# Patient Record
Sex: FEMALE | Race: WHITE
[De-identification: names, ages, dates, MRNs, and addresses within clinical notes are randomized per-mention and may not be internally consistent; named-entity substitution may affect disease eponyms.]

---

## 2020-03-06 ENCOUNTER — HOSPITAL ENCOUNTER (INPATIENT)
Dept: HOSPITAL 47 - EC | Age: 80
LOS: 2 days | Discharge: HOSPICE | DRG: 871 | End: 2020-03-08
Attending: HOSPITALIST | Admitting: HOSPITALIST
Payer: MEDICARE

## 2020-03-06 VITALS — BODY MASS INDEX: 27.9 KG/M2

## 2020-03-06 DIAGNOSIS — J96.02: ICD-10-CM

## 2020-03-06 DIAGNOSIS — Z51.5: ICD-10-CM

## 2020-03-06 DIAGNOSIS — N17.0: ICD-10-CM

## 2020-03-06 DIAGNOSIS — R63.0: ICD-10-CM

## 2020-03-06 DIAGNOSIS — Z79.82: ICD-10-CM

## 2020-03-06 DIAGNOSIS — D69.6: ICD-10-CM

## 2020-03-06 DIAGNOSIS — I48.0: ICD-10-CM

## 2020-03-06 DIAGNOSIS — T83.83XA: ICD-10-CM

## 2020-03-06 DIAGNOSIS — J96.01: ICD-10-CM

## 2020-03-06 DIAGNOSIS — F41.9: ICD-10-CM

## 2020-03-06 DIAGNOSIS — A41.9: Primary | ICD-10-CM

## 2020-03-06 DIAGNOSIS — E78.5: ICD-10-CM

## 2020-03-06 DIAGNOSIS — I13.0: ICD-10-CM

## 2020-03-06 DIAGNOSIS — E11.22: ICD-10-CM

## 2020-03-06 DIAGNOSIS — F32.9: ICD-10-CM

## 2020-03-06 DIAGNOSIS — Z79.1: ICD-10-CM

## 2020-03-06 DIAGNOSIS — G93.41: ICD-10-CM

## 2020-03-06 DIAGNOSIS — Z79.4: ICD-10-CM

## 2020-03-06 DIAGNOSIS — Z79.899: ICD-10-CM

## 2020-03-06 DIAGNOSIS — I50.9: ICD-10-CM

## 2020-03-06 DIAGNOSIS — I69.320: ICD-10-CM

## 2020-03-06 DIAGNOSIS — N18.3: ICD-10-CM

## 2020-03-06 DIAGNOSIS — Z74.01: ICD-10-CM

## 2020-03-06 DIAGNOSIS — M19.90: ICD-10-CM

## 2020-03-06 DIAGNOSIS — Z79.01: ICD-10-CM

## 2020-03-06 DIAGNOSIS — N13.6: ICD-10-CM

## 2020-03-06 DIAGNOSIS — Z66: ICD-10-CM

## 2020-03-06 DIAGNOSIS — J44.0: ICD-10-CM

## 2020-03-06 DIAGNOSIS — E87.4: ICD-10-CM

## 2020-03-06 DIAGNOSIS — J44.1: ICD-10-CM

## 2020-03-06 DIAGNOSIS — D63.1: ICD-10-CM

## 2020-03-06 DIAGNOSIS — R31.9: ICD-10-CM

## 2020-03-06 DIAGNOSIS — R04.0: ICD-10-CM

## 2020-03-06 DIAGNOSIS — E87.5: ICD-10-CM

## 2020-03-06 DIAGNOSIS — I69.351: ICD-10-CM

## 2020-03-06 DIAGNOSIS — J10.00: ICD-10-CM

## 2020-03-06 LAB
ALBUMIN SERPL-MCNC: 2.9 G/DL (ref 3.5–5)
ALP SERPL-CCNC: 160 U/L (ref 38–126)
ALT SERPL-CCNC: 14 U/L (ref 4–34)
ANION GAP SERPL CALC-SCNC: 14 MMOL/L
APTT BLD: 35 SEC (ref 22–30)
AST SERPL-CCNC: 34 U/L (ref 14–36)
BASOPHILS # BLD AUTO: 0 K/UL (ref 0–0.2)
BASOPHILS NFR BLD AUTO: 0 %
BUN SERPL-SCNC: 69 MG/DL (ref 7–17)
CALCIUM SPEC-MCNC: 7.6 MG/DL (ref 8.4–10.2)
CHLORIDE SERPL-SCNC: 108 MMOL/L (ref 98–107)
CO2 SERPL-SCNC: 19 MMOL/L (ref 22–30)
EOSINOPHIL # BLD AUTO: 0 K/UL (ref 0–0.7)
EOSINOPHIL NFR BLD AUTO: 0 %
ERYTHROCYTE [DISTWIDTH] IN BLOOD BY AUTOMATED COUNT: 2.84 M/UL (ref 3.8–5.4)
ERYTHROCYTE [DISTWIDTH] IN BLOOD: 14.8 % (ref 11.5–15.5)
GLUCOSE BLD-MCNC: 284 MG/DL (ref 75–99)
GLUCOSE SERPL-MCNC: 264 MG/DL (ref 74–99)
HCT VFR BLD AUTO: 26.4 % (ref 34–46)
HGB BLD-MCNC: 8.2 GM/DL (ref 11.4–16)
INR PPP: 1.2 (ref ?–1.2)
LYMPHOCYTES # SPEC AUTO: 0.6 K/UL (ref 1–4.8)
LYMPHOCYTES NFR SPEC AUTO: 11 %
MCH RBC QN AUTO: 28.9 PG (ref 25–35)
MCHC RBC AUTO-ENTMCNC: 31 G/DL (ref 31–37)
MCV RBC AUTO: 93.1 FL (ref 80–100)
MONOCYTES # BLD AUTO: 0.2 K/UL (ref 0–1)
MONOCYTES NFR BLD AUTO: 3 %
NEUTROPHILS # BLD AUTO: 4.3 K/UL (ref 1.3–7.7)
NEUTROPHILS NFR BLD AUTO: 84 %
PLATELET # BLD AUTO: 102 K/UL (ref 150–450)
POTASSIUM SERPL-SCNC: 5 MMOL/L (ref 3.5–5.1)
PROT SERPL-MCNC: 6.9 G/DL (ref 6.3–8.2)
PT BLD: 12.1 SEC (ref 9–12)
SODIUM SERPL-SCNC: 141 MMOL/L (ref 137–145)
WBC # BLD AUTO: 5.1 K/UL (ref 3.8–10.6)

## 2020-03-06 PROCEDURE — 80048 BASIC METABOLIC PNL TOTAL CA: CPT

## 2020-03-06 PROCEDURE — 86920 COMPATIBILITY TEST SPIN: CPT

## 2020-03-06 PROCEDURE — 83605 ASSAY OF LACTIC ACID: CPT

## 2020-03-06 PROCEDURE — 86901 BLOOD TYPING SEROLOGIC RH(D): CPT

## 2020-03-06 PROCEDURE — 82272 OCCULT BLD FECES 1-3 TESTS: CPT

## 2020-03-06 PROCEDURE — 93005 ELECTROCARDIOGRAM TRACING: CPT

## 2020-03-06 PROCEDURE — 36415 COLL VENOUS BLD VENIPUNCTURE: CPT

## 2020-03-06 PROCEDURE — 76770 US EXAM ABDO BACK WALL COMP: CPT

## 2020-03-06 PROCEDURE — 84484 ASSAY OF TROPONIN QUANT: CPT

## 2020-03-06 PROCEDURE — 87502 INFLUENZA DNA AMP PROBE: CPT

## 2020-03-06 PROCEDURE — 94640 AIRWAY INHALATION TREATMENT: CPT

## 2020-03-06 PROCEDURE — 86900 BLOOD TYPING SEROLOGIC ABO: CPT

## 2020-03-06 PROCEDURE — 71046 X-RAY EXAM CHEST 2 VIEWS: CPT

## 2020-03-06 PROCEDURE — 94660 CPAP INITIATION&MGMT: CPT

## 2020-03-06 PROCEDURE — 36600 WITHDRAWAL OF ARTERIAL BLOOD: CPT

## 2020-03-06 PROCEDURE — 86850 RBC ANTIBODY SCREEN: CPT

## 2020-03-06 PROCEDURE — 85610 PROTHROMBIN TIME: CPT

## 2020-03-06 PROCEDURE — 87186 SC STD MICRODIL/AGAR DIL: CPT

## 2020-03-06 PROCEDURE — 87077 CULTURE AEROBIC IDENTIFY: CPT

## 2020-03-06 PROCEDURE — 93306 TTE W/DOPPLER COMPLETE: CPT

## 2020-03-06 PROCEDURE — 96374 THER/PROPH/DIAG INJ IV PUSH: CPT

## 2020-03-06 PROCEDURE — 80053 COMPREHEN METABOLIC PANEL: CPT

## 2020-03-06 PROCEDURE — 71045 X-RAY EXAM CHEST 1 VIEW: CPT

## 2020-03-06 PROCEDURE — 83880 ASSAY OF NATRIURETIC PEPTIDE: CPT

## 2020-03-06 PROCEDURE — 85025 COMPLETE CBC W/AUTO DIFF WBC: CPT

## 2020-03-06 PROCEDURE — 87040 BLOOD CULTURE FOR BACTERIA: CPT

## 2020-03-06 PROCEDURE — 81001 URINALYSIS AUTO W/SCOPE: CPT

## 2020-03-06 PROCEDURE — 85730 THROMBOPLASTIN TIME PARTIAL: CPT

## 2020-03-06 PROCEDURE — 82040 ASSAY OF SERUM ALBUMIN: CPT

## 2020-03-06 PROCEDURE — 96375 TX/PRO/DX INJ NEW DRUG ADDON: CPT

## 2020-03-06 PROCEDURE — 99285 EMERGENCY DEPT VISIT HI MDM: CPT

## 2020-03-06 PROCEDURE — 87086 URINE CULTURE/COLONY COUNT: CPT

## 2020-03-06 PROCEDURE — 82805 BLOOD GASES W/O2 SATURATION: CPT

## 2020-03-06 RX ADMIN — CEFAZOLIN SCH MLS/HR: 330 INJECTION, POWDER, FOR SOLUTION INTRAMUSCULAR; INTRAVENOUS at 17:16

## 2020-03-06 RX ADMIN — METHYLPREDNISOLONE SODIUM SUCCINATE SCH MG: 125 INJECTION, POWDER, FOR SOLUTION INTRAMUSCULAR; INTRAVENOUS at 23:41

## 2020-03-06 RX ADMIN — APIXABAN SCH MG: 2.5 TABLET, FILM COATED ORAL at 22:09

## 2020-03-06 RX ADMIN — METHYLPREDNISOLONE SODIUM SUCCINATE SCH: 125 INJECTION, POWDER, FOR SOLUTION INTRAMUSCULAR; INTRAVENOUS at 20:24

## 2020-03-06 RX ADMIN — PIPERACILLIN AND TAZOBACTAM SCH MLS/HR: 3; .375 INJECTION, POWDER, FOR SOLUTION INTRAVENOUS at 20:36

## 2020-03-06 RX ADMIN — NICARDIPINE HYDROCHLORIDE SCH MLS/HR: 2.5 INJECTION INTRAVENOUS at 13:33

## 2020-03-06 RX ADMIN — GABAPENTIN SCH MG: 100 CAPSULE ORAL at 22:09

## 2020-03-06 RX ADMIN — Medication SCH: at 22:09

## 2020-03-06 RX ADMIN — IPRATROPIUM BROMIDE AND ALBUTEROL SULFATE SCH ML: .5; 3 SOLUTION RESPIRATORY (INHALATION) at 19:08

## 2020-03-06 RX ADMIN — NICARDIPINE HYDROCHLORIDE SCH MLS/HR: 2.5 INJECTION INTRAVENOUS at 13:32

## 2020-03-06 RX ADMIN — BETHANECHOL CHLORIDE SCH MG: 25 TABLET ORAL at 22:09

## 2020-03-06 RX ADMIN — GUAIFENESIN AND DEXTROMETHORPHAN SCH: 100; 10 SYRUP ORAL at 22:09

## 2020-03-06 RX ADMIN — ATORVASTATIN CALCIUM SCH MG: 40 TABLET, FILM COATED ORAL at 22:09

## 2020-03-06 RX ADMIN — CEFAZOLIN SCH: 330 INJECTION, POWDER, FOR SOLUTION INTRAMUSCULAR; INTRAVENOUS at 15:11

## 2020-03-06 RX ADMIN — METOPROLOL TARTRATE SCH MG: 25 TABLET, FILM COATED ORAL at 22:09

## 2020-03-06 RX ADMIN — INSULIN ASPART SCH UNIT: 100 INJECTION, SOLUTION INTRAVENOUS; SUBCUTANEOUS at 22:10

## 2020-03-06 NOTE — HP
HISTORY AND PHYSICAL



DATE OF SERVICE:

03/06/2020



CHIEF COMPLAINT:

Shortness of breath.



HISTORY OF PRESENT ILLNESS:

This 79-year-old woman with a past medical history of multiple medical problems,

previous stroke with right-sided hemiplegia, history of diabetes mellitus, type 2,

history of hyperlipidemia being followed by Dr. Benton in [facility] in the

outpatient setting, apparently not feeling well over the past 1 week.  The patient had

increasing cough and shortness of breath.  The patient came to [facility] and

was admitted for further evaluation and treatment.  The creatinine was found to be 4.1,

indicating acute renal failure.  Lactic acid was also elevated. Chest x-ray showed

possible pneumonia.  Patient admitted for further evaluation and treatment.  Stool OB

was negative.  Patient also there is a suspicion of gastrointestinal blood loss.

Hemoglobin is 8.2. Stool OB was positive apparently in the nursing home but currently

it is negative. Currently, the patient is slightly confused but able to give a sketchy

history at this time.  There is no history of any rigors, chills at this time.



PAST MEDICAL HISTORY:

History of stroke, history of diabetes mellitus, type 2.



MEDICATIONS:

1. Guaifenesin 10 mL mg p.o. q.i.d.

2. Senna 8.6 p.o. daily.

3. Tylenol 650 q.4 p.r.n.

4. Milk of Magnesia.

5. DuoNeb q.i.d. and p.r.n.

6. Glucose application.

7. Motrin 200 mg q.i.d. p.r.n.

8. Neurontin 100 mg p.o. t.i.d.

9. Urecholine 25 mg p.o. t.i.d.

10.NovoLog 4 units subcu b.i.d.

11.Lantus 22 units subcu daily.

12.Flomax 0.4 daily.

13.Lopressor 12.5 mg b.i.d.

14.Eliquis 2.5 mg b.i.d.

15.Protonix 40 mg.

16.Melatonin 1 mg p.o. q.h.s.

17.Celexa 20 mg p.o. daily.

18.Lipitor 40 mg at bedtime.

19.Aspirin 81 mg p.o. daily.



ALLERGIES:

None.



Family history, social history and review of systems could not be taken because of the

patient's change in mental status.  No history of smoking per chart.



PHYSICAL EXAM:

Patient is conscious but slightly confused.  Short of breath.  Pulse is 90. Blood

pressure 99/60, respirations 16, temp 97.4, pulse ox 99% on 2 L.

HEENT conjunctivae normal.  Oral mucosa moist.

NECK is no jugular venous distention.  No carotid bruit. No lymph node enlargement.

Jugular venous distention at the root of the neck.

CARDIOVASCULAR system:  S1, S2 muffled.  No S3, no S4.

RESPIRATIONS: Breath sounds diminished in the bases, bilateral scattered rhonchi and

crackles.  Expiratory wheezing also present.

ABDOMEN:  Soft, nontender.  No mass palpable.

LEGS:  No edema, no swelling.

NERVOUS SYSTEM: Higher functions as mentioned earlier. Moves all 4 limbs.  Mild diffuse

weakness.

LYMPHATICS: No lymph nodes palpable in the neck, axillae or groin.

SKIN no ulcers, no rash and no bleeding.

JOINTS:  No active deforming arthropathy.



LABS:

WBC 5.2, hemoglobin is 8.2, platelets 102, INR is 1.2.  Sodium 141, potassium 5,

creatinine 4.10, glucose 264.  Plasma lactic acid 2.1, calcium is 7.6, albumin is 2.9.

Stool OB is negative.



ASSESSMENT:

1. Possible chronic obstructive pulmonary disease acute exacerbation with bibasilar

    pneumonia, possibly healthcare associated pneumonia with possible sepsis present on

    admission.

2. Change in mental status, metabolic encephalopathy, acute on chronic.

3. Chronic obstructive pulmonary disease.

4. Anemia, normocytic of undetermined etiology.

5. Thrombocytopenia.

6. Acute renal failure possible acute tubular necrosis, prerenal renal failure.

7. Diabetes mellitus type 2.

8. Elevated plasma lactic acid secondary to sepsis.

9. Hypoalbuminemia with no evidence of malnutrition.

10.Hyperlipidemia.

11.Diabetes mellitus type 2.

12.Rule out congestive heart failure.

13.FULL CODE.



RECOMMENDATIONS AND DISCUSSION:

This 79-year-old woman who presented with multiple complex medical issues, we will

monitor the patient closely, continue the current medications. Continue symptomatic

treatment. We will optimize bronchodilator treatment, empiric antibiotics.  I would

recommend Zosyn at this time.  Otherwise, cautious IV fluids.  Nephrology consultation

and cardiology are also consulted for possible congestive heart failure.  We will also

obtain a 2D echo with Doppler.





Otherwise, NT proBNP is 5788.  Troponins are negative.  I would also recommend an EKG.

Prognosis guarded because of multiple complex medical issues.  Further recommendations

to follow.  The patient is currently FULL CODE.  A copy of this dictation is being

forwarded to Dr. Benton who is the primary physician.





MMEVERL / IJN: [id number] / Job#: [id number]

## 2020-03-06 NOTE — XR
EXAMINATION TYPE: XR chest 2V

 

DATE OF EXAM: 3/6/2020

 

COMPARISON: NONE

 

HISTORY: Fever

 

TECHNIQUE:  Frontal and lateral views of the chest are obtained.

 

FINDINGS:  There is no focal air space opacity, pleural effusion, or pneumothorax seen.  The cardiac 
silhouette size is mildly enlarged.  There is diffuse osseous demineralization. Mid thoracic compress
ion deformities age-indeterminate given no priors for comparison. Correlate with point tenderness.

 

IMPRESSION:  Enlarged cardiomediastinal silhouette. No acute pulmonary pathology. Age-indeterminate m
id thoracic compression deformity. Correlate with point tenderness.

## 2020-03-06 NOTE — ED
General Adult HPI





- General


Chief complaint: Recheck/Abnormal Lab/Rx


Stated complaint: CHRISS


Time Seen by Provider: 03/06/20 11:58


Source: patient, EMS, RN notes reviewed, old records reviewed


Mode of arrival: EMS


Limitations: no limitations





- History of Present Illness


Initial comments: 





Patient is an 79-year-old female who presents emergency department today with 

difficulty breathing.  She was transferred from  [facility].  Patient states

that she has been having a cough, worsening difficulty breathing for the past 

few days.  She also had a positive occult stool.  Patient was found have a low 

oxygen saturation today in the 70s.  It had improved after Patient was placed on

2 L of oxygen.  Patient does not typically wear oxygen.





- Related Data


                                    Allergies











Allergy/AdvReac Type Severity Reaction Status Date / Time


 


No Known Allergies Allergy   Verified 03/06/20 11:30














Review of Systems


ROS Statement: 


Those systems with pertinent positive or pertinent negative responses have been 

documented in the HPI.





ROS Other: All systems not noted in ROS Statement are negative.





Past Medical History


Additional Past Medical History / Comment(s): pt poor historian


History of Any Multi-Drug Resistant Organisms: None Reported


Additional Past Surgical History / Comment(s): ptpoor historian


Past Psychological History: No Psychological Hx Reported


Smoking Status: Never smoker


Past Alcohol Use History: None Reported


Past Drug Use History: None Reported





General Exam





- General Exam Comments


Initial Comments: 





79-year-old female.  Patient is clammy. 


Limitations: no limitations


General appearance: alert


Head exam: Present: atraumatic, normocephalic, normal inspection


Eye exam: Present: normal appearance, PERRL, EOMI.  Absent: scleral icterus, 

conjunctival injection, periorbital swelling


ENT exam: Present: normal exam, mucous membranes moist


Neck exam: Present: normal inspection.  Absent: tenderness, meningismus, 

lymphadenopathy


Respiratory exam: Present: other (Crackles bilaterally.).  Absent: respiratory 

distress, wheezes, rales, rhonchi, stridor


Cardiovascular Exam: Present: regular rate, normal rhythm, normal heart sounds. 

Absent: systolic murmur, diastolic murmur, rubs, gallop, clicks


GI/Abdominal exam: Present: soft, normal bowel sounds, other (diarrhea, reddish 

stool).  Absent: distended, tenderness, guarding, rebound, rigid


Extremities exam: Present: normal inspection, full ROM, normal capillary refill.

 Absent: tenderness, pedal edema, joint swelling, calf tenderness


Back exam: Present: normal inspection


Neurological exam: Present: alert, oriented X3, CN II-XII intact


Psychiatric exam: Present: normal affect, normal mood





Course


                                   Vital Signs











  03/06/20 03/06/20 03/06/20





  11:25 13:40 13:51


 


Temperature 97.4 F L  


 


Pulse Rate 88 86 88


 


Respiratory 18  





Rate   


 


Blood Pressure 101/46  


 


O2 Sat by Pulse 100  





Oximetry   














EKG Findings





- EKG Comments:


EKG Findings:: EKG performed at 1223 shows normal sinus rhythm.  Prolonged QT.  

Ventricular rate of 84 beats were minute period.  It was 138 ms.  QS duration 72

ms.  QT QTc is 422/4 and 98 ms.





Medical Decision Making





- Medical Decision Making





Patient is an 79-year-old female who presents emergency department today for 

difficulty in breathing, and concern for occult positive stool.  Patient has had

history of stroke in the past causing residual right leg pain that is chronic.  

Patient's had multiple episodes of diarrhea in emergency department.  Her stool 

occult was negative.  She's having orange reddish stool.  Patient has crackles 

on bilateral lungs was hypoxic without oxygen earlier today.  On emergency 

department she was given breathing treatment.  Chest x-ray shows evidence of 

cardiomegaly with no pleural effusion.  She does have an elevated BNP of 6000.  

She was found to have acute on chronic renal failure.  Patient's son is unable 

to state what her previous lab values were.  Patient was started on 1 g of 

Rocephin as well for preoperative likely UTI due to the persistent diarrhea.  

Unable to obtain a straight cath due to the amount of stool. Patient was found 

to have a low hemoglobin of 8.2.  She started on protonix. 





Patient will be admitted at this time for hypoxia, CHF, renal failure, and 

concern for GI bleed with low hemoglobin however her occult was negative.





- Lab Data


Result diagrams: 


                                 03/06/20 12:58





                                 03/06/20 12:58


                                   Lab Results











  03/06/20 03/06/20 03/06/20 Range/Units





  12:51 12:58 12:58 


 


WBC   5.1   (3.8-10.6)  k/uL


 


RBC   2.84 L   (3.80-5.40)  m/uL


 


Hgb   8.2 L   (11.4-16.0)  gm/dL


 


Hct   26.4 L   (34.0-46.0)  %


 


MCV   93.1   (80.0-100.0)  fL


 


MCH   28.9   (25.0-35.0)  pg


 


MCHC   31.0   (31.0-37.0)  g/dL


 


RDW   14.8   (11.5-15.5)  %


 


Plt Count   102 L   (150-450)  k/uL


 


Neutrophils %   84   %


 


Lymphocytes %   11   %


 


Monocytes %   3   %


 


Eosinophils %   0   %


 


Basophils %   0   %


 


Neutrophils #   4.3   (1.3-7.7)  k/uL


 


Lymphocytes #   0.6 L   (1.0-4.8)  k/uL


 


Monocytes #   0.2   (0-1.0)  k/uL


 


Eosinophils #   0.0   (0-0.7)  k/uL


 


Basophils #   0.0   (0-0.2)  k/uL


 


Hypochromasia   Marked   


 


PT    12.1 H  (9.0-12.0)  sec


 


INR    1.2 H  (<1.2)  


 


APTT    35.0 H  (22.0-30.0)  sec


 


Sodium     (137-145)  mmol/L


 


Potassium     (3.5-5.1)  mmol/L


 


Chloride     ()  mmol/L


 


Carbon Dioxide     (22-30)  mmol/L


 


Anion Gap     mmol/L


 


BUN     (7-17)  mg/dL


 


Creatinine     (0.52-1.04)  mg/dL


 


Est GFR (CKD-EPI)AfAm     (>60 ml/min/1.73 sqM)  


 


Est GFR (CKD-EPI)NonAf     (>60 ml/min/1.73 sqM)  


 


Glucose     (74-99)  mg/dL


 


Plasma Lactic Acid Nii  2.1 H*    (0.7-2.0)  mmol/L


 


Calcium     (8.4-10.2)  mg/dL


 


Total Bilirubin     (0.2-1.3)  mg/dL


 


AST     (14-36)  U/L


 


ALT     (4-34)  U/L


 


Alkaline Phosphatase     ()  U/L


 


Troponin I     (0.000-0.034)  ng/mL


 


NT-Pro-B Natriuret Pep     pg/mL


 


Total Protein     (6.3-8.2)  g/dL


 


Albumin     (3.5-5.0)  g/dL


 


Stool Occult Blood     (Negative)  


 


Blood Type     


 


Blood Type Confirm     


 


Blood Type Recheck     


 


Bld Type Recheck Status     


 


Antibody Screen     


 


Spec Expiration Date     














  03/06/20 03/06/20 03/06/20 Range/Units





  12:58 12:58 12:58 


 


WBC     (3.8-10.6)  k/uL


 


RBC     (3.80-5.40)  m/uL


 


Hgb     (11.4-16.0)  gm/dL


 


Hct     (34.0-46.0)  %


 


MCV     (80.0-100.0)  fL


 


MCH     (25.0-35.0)  pg


 


MCHC     (31.0-37.0)  g/dL


 


RDW     (11.5-15.5)  %


 


Plt Count     (150-450)  k/uL


 


Neutrophils %     %


 


Lymphocytes %     %


 


Monocytes %     %


 


Eosinophils %     %


 


Basophils %     %


 


Neutrophils #     (1.3-7.7)  k/uL


 


Lymphocytes #     (1.0-4.8)  k/uL


 


Monocytes #     (0-1.0)  k/uL


 


Eosinophils #     (0-0.7)  k/uL


 


Basophils #     (0-0.2)  k/uL


 


Hypochromasia     


 


PT     (9.0-12.0)  sec


 


INR     (<1.2)  


 


APTT     (22.0-30.0)  sec


 


Sodium  141    (137-145)  mmol/L


 


Potassium  5.0    (3.5-5.1)  mmol/L


 


Chloride  108 H    ()  mmol/L


 


Carbon Dioxide  19 L    (22-30)  mmol/L


 


Anion Gap  14    mmol/L


 


BUN  69 H    (7-17)  mg/dL


 


Creatinine  4.10 H    (0.52-1.04)  mg/dL


 


Est GFR (CKD-EPI)AfAm  11    (>60 ml/min/1.73 sqM)  


 


Est GFR (CKD-EPI)NonAf  10    (>60 ml/min/1.73 sqM)  


 


Glucose  264 H    (74-99)  mg/dL


 


Plasma Lactic Acid Nii     (0.7-2.0)  mmol/L


 


Calcium  7.6 L    (8.4-10.2)  mg/dL


 


Total Bilirubin  0.6    (0.2-1.3)  mg/dL


 


AST  34    (14-36)  U/L


 


ALT  14    (4-34)  U/L


 


Alkaline Phosphatase  160 H    ()  U/L


 


Troponin I   <0.012   (0.000-0.034)  ng/mL


 


NT-Pro-B Natriuret Pep    5780  pg/mL


 


Total Protein  6.9    (6.3-8.2)  g/dL


 


Albumin  2.9 L    (3.5-5.0)  g/dL


 


Stool Occult Blood     (Negative)  


 


Blood Type     


 


Blood Type Confirm     


 


Blood Type Recheck     


 


Bld Type Recheck Status     


 


Antibody Screen     


 


Spec Expiration Date     














  03/06/20 03/06/20 03/06/20 Range/Units





  13:00 13:30 13:32 


 


WBC     (3.8-10.6)  k/uL


 


RBC     (3.80-5.40)  m/uL


 


Hgb     (11.4-16.0)  gm/dL


 


Hct     (34.0-46.0)  %


 


MCV     (80.0-100.0)  fL


 


MCH     (25.0-35.0)  pg


 


MCHC     (31.0-37.0)  g/dL


 


RDW     (11.5-15.5)  %


 


Plt Count     (150-450)  k/uL


 


Neutrophils %     %


 


Lymphocytes %     %


 


Monocytes %     %


 


Eosinophils %     %


 


Basophils %     %


 


Neutrophils #     (1.3-7.7)  k/uL


 


Lymphocytes #     (1.0-4.8)  k/uL


 


Monocytes #     (0-1.0)  k/uL


 


Eosinophils #     (0-0.7)  k/uL


 


Basophils #     (0-0.2)  k/uL


 


Hypochromasia     


 


PT     (9.0-12.0)  sec


 


INR     (<1.2)  


 


APTT     (22.0-30.0)  sec


 


Sodium     (137-145)  mmol/L


 


Potassium     (3.5-5.1)  mmol/L


 


Chloride     ()  mmol/L


 


Carbon Dioxide     (22-30)  mmol/L


 


Anion Gap     mmol/L


 


BUN     (7-17)  mg/dL


 


Creatinine     (0.52-1.04)  mg/dL


 


Est GFR (CKD-EPI)AfAm     (>60 ml/min/1.73 sqM)  


 


Est GFR (CKD-EPI)NonAf     (>60 ml/min/1.73 sqM)  


 


Glucose     (74-99)  mg/dL


 


Plasma Lactic Acid Nii     (0.7-2.0)  mmol/L


 


Calcium     (8.4-10.2)  mg/dL


 


Total Bilirubin     (0.2-1.3)  mg/dL


 


AST     (14-36)  U/L


 


ALT     (4-34)  U/L


 


Alkaline Phosphatase     ()  U/L


 


Troponin I     (0.000-0.034)  ng/mL


 


NT-Pro-B Natriuret Pep     pg/mL


 


Total Protein     (6.3-8.2)  g/dL


 


Albumin     (3.5-5.0)  g/dL


 


Stool Occult Blood  Negative    (Negative)  


 


Blood Type   O Positive   


 


Blood Type Confirm    O Positive  


 


Blood Type Recheck   No Previous Record   


 


Bld Type Recheck Status   CABO Indicated   


 


Antibody Screen   NEGATIVE   


 


Spec Expiration Date   03/09/2020 - 2330   














- Radiology Data


Radiology results: report reviewed


Enlarged cardiomediastinal silhouette.  No acute pulmonary pathology.  Age-inde

terminate midthoracic compression deformity.





Disposition


Clinical Impression: 


 CHF (congestive heart failure), Renal failure, Diarrhea, Anemia





Disposition: ADMITTED AS IP TO THIS [facility]


Condition: Stable


Is patient prescribed a controlled substance at d/c from ED?: No


Referrals: 


Hong Benton MD [Primary Care Provider] - 1-2 days


Time of Disposition: 16:03

## 2020-03-07 LAB
ANION GAP SERPL CALC-SCNC: 16 MMOL/L
BASOPHILS # BLD AUTO: 0 K/UL (ref 0–0.2)
BASOPHILS NFR BLD AUTO: 0 %
BUN SERPL-SCNC: 71 MG/DL (ref 7–17)
CALCIUM SPEC-MCNC: 7.5 MG/DL (ref 8.4–10.2)
CHLORIDE SERPL-SCNC: 111 MMOL/L (ref 98–107)
CO2 BLDA-SCNC: 17 MMOL/L (ref 19–24)
CO2 BLDA-SCNC: 19 MMOL/L (ref 19–24)
CO2 BLDA-SCNC: 20 MMOL/L (ref 19–24)
CO2 SERPL-SCNC: 16 MMOL/L (ref 22–30)
EOSINOPHIL # BLD AUTO: 0 K/UL (ref 0–0.7)
EOSINOPHIL NFR BLD AUTO: 0 %
ERYTHROCYTE [DISTWIDTH] IN BLOOD BY AUTOMATED COUNT: 2.81 M/UL (ref 3.8–5.4)
ERYTHROCYTE [DISTWIDTH] IN BLOOD: 14.5 % (ref 11.5–15.5)
GLUCOSE BLD-MCNC: 127 MG/DL (ref 75–99)
GLUCOSE BLD-MCNC: 230 MG/DL (ref 75–99)
GLUCOSE BLD-MCNC: 326 MG/DL (ref 75–99)
GLUCOSE BLD-MCNC: 377 MG/DL (ref 75–99)
GLUCOSE BLD-MCNC: 92 MG/DL (ref 75–99)
GLUCOSE BLD-MCNC: 94 MG/DL (ref 75–99)
GLUCOSE SERPL-MCNC: 82 MG/DL (ref 74–99)
HCO3 BLDA-SCNC: 16 MMOL/L (ref 21–25)
HCO3 BLDA-SCNC: 17 MMOL/L (ref 21–25)
HCO3 BLDA-SCNC: 19 MMOL/L (ref 21–25)
HCT VFR BLD AUTO: 26.6 % (ref 34–46)
HGB BLD-MCNC: 7.9 GM/DL (ref 11.4–16)
LYMPHOCYTES # SPEC AUTO: 0.7 K/UL (ref 1–4.8)
LYMPHOCYTES NFR SPEC AUTO: 11 %
MCH RBC QN AUTO: 28 PG (ref 25–35)
MCHC RBC AUTO-ENTMCNC: 29.5 G/DL (ref 31–37)
MCV RBC AUTO: 94.7 FL (ref 80–100)
MONOCYTES # BLD AUTO: 0.1 K/UL (ref 0–1)
MONOCYTES NFR BLD AUTO: 2 %
NEUTROPHILS # BLD AUTO: 5.3 K/UL (ref 1.3–7.7)
NEUTROPHILS NFR BLD AUTO: 85 %
PCO2 BLDA: 42 MMHG (ref 35–45)
PCO2 BLDA: 47 MMHG (ref 35–45)
PCO2 BLDA: 52 MMHG (ref 35–45)
PH BLDA: 7.16 [PH] (ref 7.35–7.45)
PH BLDA: 7.17 [PH] (ref 7.35–7.45)
PH BLDA: 7.18 [PH] (ref 7.35–7.45)
PH UR: 6.5 [PH] (ref 5–8)
PLATELET # BLD AUTO: 126 K/UL (ref 150–450)
PO2 BLDA: 172 MMHG (ref 83–108)
PO2 BLDA: 331 MMHG (ref 83–108)
PO2 BLDA: >400 MMHG (ref 83–108)
POTASSIUM SERPL-SCNC: 5.6 MMOL/L (ref 3.5–5.1)
PROT UR QL: (no result)
RBC UR QL: >182 /HPF (ref 0–5)
SODIUM SERPL-SCNC: 143 MMOL/L (ref 137–145)
SP GR UR: 1.01 (ref 1–1.03)
UROBILINOGEN UR QL STRIP: <2 MG/DL (ref ?–2)
WBC # BLD AUTO: 6.2 K/UL (ref 3.8–10.6)
WBC # UR AUTO: >182 /HPF (ref 0–5)

## 2020-03-07 RX ADMIN — INSULIN ASPART SCH: 100 INJECTION, SOLUTION INTRAVENOUS; SUBCUTANEOUS at 12:37

## 2020-03-07 RX ADMIN — OSELTAMIVIR PHOSPHATE SCH: 6 POWDER, FOR SUSPENSION ORAL at 10:31

## 2020-03-07 RX ADMIN — ASPIRIN 325 MG ORAL TABLET SCH: 325 PILL ORAL at 16:04

## 2020-03-07 RX ADMIN — IPRATROPIUM BROMIDE AND ALBUTEROL SULFATE SCH ML: .5; 3 SOLUTION RESPIRATORY (INHALATION) at 18:55

## 2020-03-07 RX ADMIN — METOPROLOL TARTRATE SCH MG: 1 INJECTION, SOLUTION INTRAVENOUS at 23:05

## 2020-03-07 RX ADMIN — BETHANECHOL CHLORIDE SCH: 25 TABLET ORAL at 10:30

## 2020-03-07 RX ADMIN — PIPERACILLIN AND TAZOBACTAM SCH MLS/HR: 3; .375 INJECTION, POWDER, FOR SOLUTION INTRAVENOUS at 03:16

## 2020-03-07 RX ADMIN — ATORVASTATIN CALCIUM SCH: 40 TABLET, FILM COATED ORAL at 20:52

## 2020-03-07 RX ADMIN — GABAPENTIN SCH: 100 CAPSULE ORAL at 17:12

## 2020-03-07 RX ADMIN — GUAIFENESIN AND DEXTROMETHORPHAN SCH: 100; 10 SYRUP ORAL at 06:50

## 2020-03-07 RX ADMIN — PIPERACILLIN AND TAZOBACTAM SCH MLS/HR: 3; .375 INJECTION, POWDER, FOR SOLUTION INTRAVENOUS at 23:05

## 2020-03-07 RX ADMIN — INSULIN ASPART SCH: 100 INJECTION, SOLUTION INTRAVENOUS; SUBCUTANEOUS at 16:04

## 2020-03-07 RX ADMIN — IPRATROPIUM BROMIDE AND ALBUTEROL SULFATE PRN ML: .5; 3 SOLUTION RESPIRATORY (INHALATION) at 23:05

## 2020-03-07 RX ADMIN — METHYLPREDNISOLONE SODIUM SUCCINATE SCH MG: 125 INJECTION, POWDER, FOR SOLUTION INTRAMUSCULAR; INTRAVENOUS at 06:59

## 2020-03-07 RX ADMIN — IPRATROPIUM BROMIDE AND ALBUTEROL SULFATE SCH ML: .5; 3 SOLUTION RESPIRATORY (INHALATION) at 16:01

## 2020-03-07 RX ADMIN — IPRATROPIUM BROMIDE AND ALBUTEROL SULFATE SCH ML: .5; 3 SOLUTION RESPIRATORY (INHALATION) at 07:49

## 2020-03-07 RX ADMIN — BETHANECHOL CHLORIDE SCH: 25 TABLET ORAL at 12:37

## 2020-03-07 RX ADMIN — Medication SCH: at 20:54

## 2020-03-07 RX ADMIN — METHYLPREDNISOLONE SODIUM SUCCINATE SCH MG: 125 INJECTION, POWDER, FOR SOLUTION INTRAMUSCULAR; INTRAVENOUS at 12:36

## 2020-03-07 RX ADMIN — METOPROLOL TARTRATE SCH MG: 1 INJECTION, SOLUTION INTRAVENOUS at 17:11

## 2020-03-07 RX ADMIN — METHYLPREDNISOLONE SODIUM SUCCINATE SCH MG: 125 INJECTION, POWDER, FOR SOLUTION INTRAMUSCULAR; INTRAVENOUS at 17:11

## 2020-03-07 RX ADMIN — TAMSULOSIN HYDROCHLORIDE SCH: 0.4 CAPSULE ORAL at 07:59

## 2020-03-07 RX ADMIN — GUAIFENESIN AND DEXTROMETHORPHAN SCH: 100; 10 SYRUP ORAL at 16:05

## 2020-03-07 RX ADMIN — CITALOPRAM HYDROBROMIDE SCH: 20 TABLET ORAL at 07:59

## 2020-03-07 RX ADMIN — POTASSIUM CHLORIDE SCH MLS/HR: 14.9 INJECTION, SOLUTION INTRAVENOUS at 10:36

## 2020-03-07 RX ADMIN — PIPERACILLIN AND TAZOBACTAM SCH MLS/HR: 3; .375 INJECTION, POWDER, FOR SOLUTION INTRAVENOUS at 12:36

## 2020-03-07 RX ADMIN — INSULIN ASPART SCH: 100 INJECTION, SOLUTION INTRAVENOUS; SUBCUTANEOUS at 07:06

## 2020-03-07 RX ADMIN — IPRATROPIUM BROMIDE AND ALBUTEROL SULFATE SCH: .5; 3 SOLUTION RESPIRATORY (INHALATION) at 11:59

## 2020-03-07 RX ADMIN — INSULIN DETEMIR SCH: 100 INJECTION, SOLUTION SUBCUTANEOUS at 07:59

## 2020-03-07 RX ADMIN — GUAIFENESIN AND DEXTROMETHORPHAN SCH: 100; 10 SYRUP ORAL at 23:09

## 2020-03-07 RX ADMIN — METHYLPREDNISOLONE SODIUM SUCCINATE SCH MG: 125 INJECTION, POWDER, FOR SOLUTION INTRAMUSCULAR; INTRAVENOUS at 23:07

## 2020-03-07 RX ADMIN — PANTOPRAZOLE SODIUM SCH MG: 40 INJECTION, POWDER, FOR SOLUTION INTRAVENOUS at 10:28

## 2020-03-07 RX ADMIN — GABAPENTIN SCH: 100 CAPSULE ORAL at 12:37

## 2020-03-07 RX ADMIN — BETHANECHOL CHLORIDE SCH: 25 TABLET ORAL at 17:12

## 2020-03-07 RX ADMIN — GUAIFENESIN AND DEXTROMETHORPHAN SCH: 100; 10 SYRUP ORAL at 10:31

## 2020-03-07 RX ADMIN — GABAPENTIN SCH: 100 CAPSULE ORAL at 10:31

## 2020-03-07 RX ADMIN — INSULIN ASPART SCH UNIT: 100 INJECTION, SOLUTION INTRAVENOUS; SUBCUTANEOUS at 12:36

## 2020-03-07 RX ADMIN — INSULIN ASPART SCH UNIT: 100 INJECTION, SOLUTION INTRAVENOUS; SUBCUTANEOUS at 17:12

## 2020-03-07 NOTE — PN
PROGRESS NOTE



DATE OF SERVICE:

03/07/2020



This 79-year-old woman who was admitted with COPD acute exacerbation as well as

bibasilar pneumonia also had influenza positive.  The patient possible influenza

pneumonia.  Last night the patient went hypoxic and the patient was transferred 
to ICU.

Patient is on BiPAP on and off.  Dr. Randhawa is following the patient closely. 
Two-D

echo showed ejection fraction about 60-65 percent.  Cardiology is following the 
patient

also.  The patient was also seen by Dr. Randhawa today.  Two-D echo was noted.  
The

nephrology is also seeing the patient and creatinine is still elevated at 4.51 
at this

time.  The patient continues to as well.



PAST MEDICAL HISTORY:

Reviewed.



REVIEW OF SYSTEMS:

Could not be taken. The patient is confused on BIPAP.



CURRENT MEDICATIONS:

Reviewed and include:

1. Tylenol p.r.n.

2. Norco 5 mg.

3. Maalox.

4. DuoNeb q.i.d. and p.r.n.

5. Xanax.

6. Lipitor.

7. Celexa.

8. Neurontin.

9. NovoLog.

10.Levemir.

11.Milk of magnesia.

12.Solu-Medrol 60 IV q.6h.

13.Narcan.

14.Zofran.

15.Tamiflu.

16.Zosyn 3.5 IV q.6h.

17.Flomax.



PHYSICAL EXAMINATION:

The pulse is 108, blood pressure 120/60.  Respirations 24, temperature 98.4, 
pulse ox

97% on BiPAP, 40% FiO2 and BiPAP settings are noted.

HEENT:  Conjunctivae normal.  Oral mucosa moist.

NECK is no jugular venous distention.  No carotid bruit. No lymph node 
enlargement.

CARDIOVASCULAR systems: S1, S2 muffled.

RESPIRATION: Breath sounds diminished in the bases.  Bilateral scattered rhonchi
and

crackles.

ABDOMEN:  Soft, nontender.

LEGS:  No edema.

NERVOUS SYSTEM: Diffusely weak.



LABS:

Hemoglobin 7.9, and ABGs pH of 7.18 and sodium 143, pCO2 is 17, and CO2 16.  
Creatinine

is 4.51.  UA noted.  Influenza A is positive.



ASSESSMENT:

1. chronic obstructive pulmonary disease acute exacerbation with bibasilar 
pneumonia,

    possibly influenza pneumonia with acute hypoxic respiratory failure with 
possible

    sepsis, present on admission.

2. Influenza A positive.

3. Change in mental status, metabolic encephalopathy, acute on chronic.

4. Acute renal failure possible acute tubular necrosis, prerenal factors.

5. History of chronic obstructive pulmonary disease.

6. Anemia, normocytic of undetermined etiology.

7. Thrombocytopenia.

8. Diabetes mellitus type 2.

9. Elevated plasma lactic acid secondary to sepsis.

10.Hypoalbuminemia with no evidence of malnutrition.

11.Severe metabolic acidosis.

12.Hyperlipidemia.

13.Diabetes mellitus type 2.

14.FULL CODE.



RECOMMENDATIONS AND DISCUSSION:

In this 79-year-old gentleman who presented with multiple medical issues, at 
this time,

I recommend to continue current medications, management and symptomatic 
treatment.

Continue the bronchodilators, antibiotics, antivirals and steroids empirically.

Otherwise follow closely with multiple consultants. Chest x-ray reviewed, still 
shows

some opacities.  Otherwise we will continue with the BiPAP.  Overall prognosis 
guarded

because of multiple complex medical issues.  As mentioned earlier, the patient 
is NO

CODE, NO CPR,  NO VENT at this time.



Once again the prognosis extremely guarded because of multiple complex medical 
issues.

Further recommendations to follow.





MMODL / IJN: [id number] / Job#: [id number]

MTDD

## 2020-03-07 NOTE — ECHOF
Referral Reason:chf



MEASUREMENTS

--------

HEIGHT: 154.9 cm

WEIGHT: 67.1 kg

BP: 118/74

RVIDd:   2.6 cm     (< 3.3)

IVSd:   1.3 cm     (0.6 - 1.1)

LVIDd:   3.2 cm     (3.9 - 5.3)

LVPWd:   1.3 cm     (0.6 - 1.1)

IVSs:   1.8 cm

LVIDs:   1.9 cm

LVPWs:   1.6 cm

LA Diam:   3.2 cm     (2.7 - 3.8)

LAESV Index (A-L):   26.31 ml/m

Ao Diam:   2.9 cm     (2.0 - 3.7)

AV Cusp:   1.8 cm     (1.5 - 2.6)

MV EXCURSION:   15.792 mm     (> 18.000)

MV EF SLOPE:   44 mm/s     (70 - 150)

EPSS:   0.5 cm

MV E Josr:   0.94 m/s

MV DecT:   231 ms

MV A Josr:   1.27 m/s

MV E/A Ratio:   0.74 

RAP:   5.00 mmHg

RVSP:   38.34 mmHg







FINDINGS

--------

Sinus rhythm.   Resting tachycardia (HR>100bpm).

This was a technically adequate study.

The left ventricular size is normal.   There is mild concentric left ventricular hypertrophy.   Overa
ll left ventricular systolic function is normal with, an EF between 60 - 65 %.

The right ventricle is normal in size.

Normal LA  size by volume 22+/-6 ml/m2.

The right atrial size is normal.

The atrial septal defect shunts from left to right.

There is mild aortic valve sclerosis.

Moderate mitral annular calcification present.   Mild mitral regurgitation is present.

Mild tricuspid regurgitation present.   There is mild pulmonary hypertension.   The right ventricular
 systolic pressure, as measured by Doppler, is 38.34mmHg.

Trace/mild (physiologic)  pulmonic regurgitation.

The aortic root size is normal.

IVC Not well visulized.

There is no pericardial effusion.



CONCLUSIONS

--------

1. Sinus rhythm.

2. Resting tachycardia (HR>100bpm).

3. This was a technically adequate study.

4. The left ventricular size is normal.

5. There is mild concentric left ventricular hypertrophy.

6. Overall left ventricular systolic function is normal with, an EF between 60 - 65 %.

7. Normal LA size by volume 22+/-6 ml/m2.

8. There is mild aortic valve sclerosis.

9. Moderate mitral annular calcification present.

10. Mild mitral regurgitation is present.

11. Mild tricuspid regurgitation present.

12. There is mild pulmonary hypertension.

13. Trace/mild (physiologic)  pulmonic regurgitation.

14. IVC Not well visulized.

15. There is no pericardial effusion.





SONOGRAPHER: Marzena Pearce RDCS

## 2020-03-07 NOTE — CONS
CONSULTATION



Mrs. Booker is an 79-year-old female who was admitted with symptoms of progressive

dyspnea, hypoxemia, and change in mental status.  The history is obtained from the

records.  Apparently, the patient had a prior history of cerebrovascular accident with

right-sided hemiplegia.  She is bedridden, living at the lodge in [place].  She has,

according to the record, paroxysmal atrial fibrillation and has been anticoagulated in

the past.  She is in sinus mechanism on presentation.  She was influenza positive on

admission.  Cardiology consultation was requested because of possible heart failure.  I

do not have any records about the patient.  Patient has not been admitted here in the

past and there is no prior documented cardiac history.



REVIEW OF SYMPTOMS:

Could not be obtained from the patient.  The patient was found to have significant

renal failure upon admission.  She is a NO CODE at this time, but the family according

to the nursing staff is considering comfort care.  They are not quite sure about

dialysis.



MEDICATIONS:

Include insulin, Flomax, Lopressor 12 and half mg twice a day, Eliquis 2.5 mg twice a

day, Protonix, Lipitor 40 mg daily, aspirin, DuoNeb.



PHYSICAL EXAMINATION:

She is an 79-year-old female on the BiPAP, not answering questions.  Blood pressure

127/50 with a heart rate in the high 90s, low 100s.  HEAD:  Normocephalic.  Eyes:

Sclerae anicteric.  Neck:  No bruit.  Lungs with decreased breath sounds.  Heart

tachycardic S1, S2.  No S3 and no rub with a systolic murmur at the base.

ABDOMEN:  Soft, nontender.  Positive bowel sounds.  No megaly.

EXTREMITIES:  No edema.



LAB DATA:

Lab data revealed a pH 7.17, a pCO2 over 400, BUN and creatinine 71 and 0.51.

Potassium 5.6, hemoglobin of 7.9.



EKG revealed a sinus mechanism with no acute ST-segment changes.  Chest x-ray revealed

interstitial changes.



IMPRESSION:

1. Respiratory failure, probably related to influenza.

2. Renal failure.

3. History of paroxysmal atrial fibrillation.

4. History of stroke.



RECOMMENDATIONS:

From the cardiac standpoint, the patient is not able to take oral medication at this

time.  I will put on IV Lopressor.  We will await the decision of the family regarding

her further progression and plans.  I would not recommend any aggressive workup at this

time and I do not feel that the patient has active heart failure from a  primary

cardiac cause, could be related to the renal function.



Thank you for this consult.  We will follow with you.





KASHMIR / JEREMI: [id number] / Job#: [id number]

## 2020-03-07 NOTE — P.CNPUL
History of Present Illness


Consult date: 03/07/20


Reason for consult: dyspnea


History of present illness: 





79-year-old female patient, known history of CVA with right-sided hemiplegia, 

was been essentially bedridden living at [facility]..  The patient came into 

the emergency department with difficulty breathing.  She was transferred from 

the nursing home.  She was having increased cough and shortness of breath and 

chest tightness and wheezing.  Most of asthma.  Most of COPD.  Her pulse ox was 

low and the low 70s.  She was initially placed on 2 L about 2 by nasal cannula 

and she was admitted to the floor.  Influenza a was positive.  Started on 

Tamiflu.  Overnight, the patient became more tachypneic and short of breath and 

hypoxic.  She got transferred to the intensive care unit.  She is a DNR/DNI CODE

STATUS.  Currently she is on BiPAP.  The BiPAP setting was discussed with 

throughout the night.  Her blood gases showed a common initial respiratory and 

metabolic acidosis.  PH was at 7.17 with a pCO2 of 47 and pO2 of more than 400. 

The most recent setting that I have around this morning is a BiPAP of 12 over 4 

cm in addition to an FiO2 of 70% and I time of 0.8.  The chest x-ray from this 

morning is showing scattered interstitial infiltrates bilaterally consistent 

with essential pneumoniaairfit no evidence of any airspace disease or 

consolidation.  She is afebrile.  She is on bronchodilators patient on systemic 

steroids.  She had developed an acute kidney injury.  Creatinine is up to 4.5 

this morning in addition to a metabolic acidosis with a serum bicarb of 16.  The

Decker catheter was inserted and this was a traumatic insertion and the patient 

has a bloody urine output which is in the order of 20 mL an hour.  Her IV fluids

is running at KVO.  She is very lethargic.  She is arousable however she doesn't

follow commands and she is not answering questions appropriately.  The son is at

the bedside.  He confirms the DNR/DNI CODE STATUS.





Review of Systems


ROS unobtainable: due to mental status





Past Medical History


Past Medical History: CVA/TIA, Hyperlipidemia


Additional Past Medical History / Comment(s): Chronic anemia, chronic anxiety 

disorder, depression, history of paroxysmal atrial fibrillation, diabetes 

mellitus type 2, expressive aphasia following his stroke, running stage III 

kidney disease, hyperlipidemia, osteoarthritis, anorexia and ongoing weight 

loss.


History of Any Multi-Drug Resistant Organisms: None Reported


Past Psychological History: No Psychological Hx Reported


Smoking Status: Never smoker


Past Alcohol Use History: None Reported


Past Drug Use History: None Reported





Medications and Allergies


                                Home Medications











 Medication  Instructions  Recorded  Confirmed  Type


 


Acetaminophen Tab [Tylenol] 650 mg PO Q4H PRN 03/06/20 03/06/20 History


 


Apixaban [Eliquis] 2.5 mg PO BID 03/06/20 03/06/20 History


 


Aspirin 81 mg PO DAILY@0600 03/06/20 03/06/20 History


 


Atorvastatin [Lipitor] 40 mg PO HS@2000 03/06/20 03/06/20 History


 


Bethanechol [Urecholine] 25 mg PO TID@0700,1300,1900 03/06/20 03/06/20 History


 


Citalopram Hydrobromide 20 mg PO DAILY@0600 03/06/20 03/06/20 History





[Citalopram HBr]    


 


Dextrose Gel [Glutose 15 Gel] 1 applic PO DAILY PRN 03/06/20 03/06/20 History


 


Gabapentin [Neurontin] 100 mg PO TID@0700,1300,1900 03/06/20 03/06/20 History


 


Ibuprofen [Motrin Ib] 200 mg PO QID@07,13,19,23 03/06/20 03/06/20 History


 


Insulin Aspart [NovoLOG] 4 units SQ BID@1100,1600 03/06/20 03/06/20 History


 


Insulin Glargine,Hum.rec.anlog 22 unit SQ DAILY 03/06/20 03/06/20 History





[Lantus Solostar]    


 


Ipratropium-Albuterol Nebulize 3 ml INHALATION RT-Q6H PRN 03/06/20 03/06/20 

History





[Duoneb 0.5 mg-3 mg/3 ml Soln]    


 


Mag Hydrox/Aluminum Hyd/Simeth 30 ml PO Q4H PRN 03/06/20 03/06/20 History





[Mylanta Maximum Strength Liq]    


 


Magnesium Hydroxide [Milk of 2,400 mg PO Q72H PRN 03/06/20 03/06/20 History





Magnesia]    


 


Melatonin 1 mg PO HS@2000 03/06/20 03/06/20 History


 


Metoprolol Tartrate [Lopressor] 12.5 mg PO BID 03/06/20 03/06/20 History


 


Pantoprazole [Protonix] 40 mg PO DAILY@0600 03/06/20 03/06/20 History


 


Sennosides [Senna] 8.6 mg PO DAILY PRN 03/06/20 03/06/20 History


 


Tamsulosin [Flomax] 0.4 mg PO DAILY@0600 03/06/20 03/06/20 History


 


guaiFENesin--10MG/5ML 10 ml PO QID@05,11,17,23 03/06/20 03/06/20 History





[Robitussin DM]    








                                    Allergies











Allergy/AdvReac Type Severity Reaction Status Date / Time


 


No Known Allergies Allergy   Verified 03/06/20 17:33














Physical Exam


Vitals: 


                                   Vital Signs











  Temp Pulse Pulse Resp BP BP Pulse Ox


 


 03/07/20 08:00   105 H   20  127/58   98


 


 03/07/20 07:57   101 H     


 


 03/07/20 07:00   105 H   21  118/75   99


 


 03/07/20 06:04  97.4 F L  105 H   20  118/74   90 L


 


 03/07/20 05:00    99  20   121/72  100


 


 03/07/20 04:00  97.5 F L   102 H  22   116/68  88 L


 


 03/07/20 03:26  98.0 F   92  18   140/74 


 


 03/07/20 02:20  98.0 F   82  22   107/78  82 L


 


 03/06/20 23:00  97.4 F L   112 H  16   131/63  99


 


 03/06/20 19:18   100     


 


 03/06/20 19:08   102 H      94 L


 


 03/06/20 18:30  97.8 F   105 H  20   111/50  100


 


 03/06/20 17:15   90   16  99/63   99


 


 03/06/20 16:19   72   16  101/74   99


 


 03/06/20 13:51   88     


 


 03/06/20 13:40   86     


 


 03/06/20 11:25  97.4 F L  88   18  101/46   100








                                Intake and Output











 03/06/20 03/07/20 03/07/20





 22:59 06:59 14:59


 


Intake Total 50 100 


 


Output Total  100 0


 


Balance 50 0 0


 


Intake:   


 


  IV  100 


 


    Piperacillin-Tazobactam 3  100 





    .375 gm In Sodium   





    Chloride 0.9% 100 ml @ 25   





    mls/hr IVPB Q8H [facility] Rx#:   





    [id number]   


 


  Oral 50  


 


Output:   


 


  Urine  100 0


 


Other:   


 


  Voiding Method  Indwelling Catheter 


 


  Weight 67.132 kg  

















Lethargic, currently on a BiPAP.  The patient is aphasic and she is unable to 

communicate at this point in time.  She is not following commands.  She is in 

mild degree of respiratory distress even while being on a BiPAP with a full face

mask.


Head exam was generally normal. There was no scleral icterus or corneal arcus. 

Mucous membranes were moist.


Neck was supple and without jugular venous distension, thyromegaly, or carotid 

bruits. Carotids were easily palpable bilaterally. There was no adenopathy.


Lungs sounds reveal diffuse rhonchi and diffuse expiratory wheezes heard 

throughout the lung fields bilaterally.  The patient has also some mild kyphosis

of the thoracic spine.


Cardiac exam revealed the PMI to be normally situated and sized. The rhythm was 

regular and no extrasystoles were noted during several minutes of auscultation. 

The first and second heart sounds were normal and physiologic splitting of the 

second heart sound was noted. There were no murmurs, rubs, clicks, or gallops.  

This morning the patient is tachycardic and she is in sinus tachycardia.  No 

atrial fibrillation was noted.


Abdominal exam revealed normal bowel sounds. The abdomen was soft, non-tender, 

and without masses, organomegaly, or appreciable enlargement of the abdominal 

aorta.


Examination of the extremities revealed easily palpable radial, femoral and 

pedal pulses. There was no cyanosis, clubbing or edema.


Examination of the skin revealed no evidence of significant rashes, suspicious 

appearing nevi or other concerning lesions.


Neurologically the patient has a right-sided weakness related to previous CVA 

along with expressive aphasia.





Results





- Laboratory Findings


CBC and BMP: 


                                 03/07/20 02:53





                                 03/07/20 02:53


ABG











ABG pH  7.17  (7.35-7.45)  L*  03/07/20  05:22    


 


ABG pCO2  47 mmHg (35-45)  H  03/07/20  05:22    


 


ABG pO2  >400 mmHg ()  H  03/07/20  05:22    


 


ABG O2 Saturation  100.0 % (94-97)  H  03/07/20  05:22    





PT/INR, D-dimer











PT  12.1 sec (9.0-12.0)  H  03/06/20  12:58    


 


INR  1.2  (<1.2)  H  03/06/20  12:58    








Abnormal lab findings: 


                                  Abnormal Labs











  03/06/20 03/06/20 03/06/20





  12:51 12:58 12:58


 


RBC   2.84 L 


 


Hgb   8.2 L 


 


Hct   26.4 L 


 


MCHC   


 


Plt Count   102 L 


 


Lymphocytes #   0.6 L 


 


PT    12.1 H


 


INR    1.2 H


 


APTT    35.0 H


 


ABG pH   


 


ABG pCO2   


 


ABG pO2   


 


ABG HCO3   


 


ABG O2 Saturation   


 


Potassium   


 


Chloride   


 


Carbon Dioxide   


 


BUN   


 


Creatinine   


 


Glucose   


 


POC Glucose (mg/dL)   


 


Plasma Lactic Acid Nii  2.1 H*  


 


Calcium   


 


Alkaline Phosphatase   


 


Albumin   


 


Urine Appearance   


 


Urine Protein   


 


Urine Blood   


 


Ur Leukocyte Esterase   


 


Urine RBC   


 


Urine WBC   


 


Urine WBC Clumps   


 


Urine Bacteria   


 


Influenza Type A RNA   














  03/06/20 03/06/20 03/06/20





  12:58 16:43 20:26


 


RBC   


 


Hgb   


 


Hct   


 


MCHC   


 


Plt Count   


 


Lymphocytes #   


 


PT   


 


INR   


 


APTT   


 


ABG pH   


 


ABG pCO2   


 


ABG pO2   


 


ABG HCO3   


 


ABG O2 Saturation   


 


Potassium   


 


Chloride  108 H  


 


Carbon Dioxide  19 L  


 


BUN  69 H  


 


Creatinine  4.10 H  


 


Glucose  264 H  


 


POC Glucose (mg/dL)    284 H


 


Plasma Lactic Acid Nii   2.7 H* 


 


Calcium  7.6 L  


 


Alkaline Phosphatase  160 H  


 


Albumin  2.9 L  


 


Urine Appearance   


 


Urine Protein   


 


Urine Blood   


 


Ur Leukocyte Esterase   


 


Urine RBC   


 


Urine WBC   


 


Urine WBC Clumps   


 


Urine Bacteria   


 


Influenza Type A RNA   














  03/07/20 03/07/20 03/07/20





  00:25 00:25 02:53


 


RBC    2.81 L


 


Hgb    7.9 L


 


Hct    26.6 L


 


MCHC    29.5 L


 


Plt Count    126 L


 


Lymphocytes #    0.7 L


 


PT   


 


INR   


 


APTT   


 


ABG pH   


 


ABG pCO2   


 


ABG pO2   


 


ABG HCO3   


 


ABG O2 Saturation   


 


Potassium   


 


Chloride   


 


Carbon Dioxide   


 


BUN   


 


Creatinine   


 


Glucose   


 


POC Glucose (mg/dL)   


 


Plasma Lactic Acid Nii   


 


Calcium   


 


Alkaline Phosphatase   


 


Albumin   


 


Urine Appearance  Turbid H  


 


Urine Protein  2+ H  


 


Urine Blood  Moderate H  


 


Ur Leukocyte Esterase  Large H  


 


Urine RBC  >182 H  


 


Urine WBC  >182 H  


 


Urine WBC Clumps  Many H  


 


Urine Bacteria  Many H  


 


Influenza Type A RNA   Detected H 














  03/07/20 03/07/20 03/07/20





  02:53 03:15 05:22


 


RBC   


 


Hgb   


 


Hct   


 


MCHC   


 


Plt Count   


 


Lymphocytes #   


 


PT   


 


INR   


 


APTT   


 


ABG pH   7.16 L*  7.17 L*


 


ABG pCO2   52 H  47 H


 


ABG pO2   331 H  >400 H


 


ABG HCO3   19 L  17 L


 


ABG O2 Saturation   99.8 H  100.0 H


 


Potassium  5.6 H  


 


Chloride  111 H  


 


Carbon Dioxide  16 L  


 


BUN  71 H  


 


Creatinine  4.51 H  


 


Glucose   


 


POC Glucose (mg/dL)   


 


Plasma Lactic Acid Nii   


 


Calcium  7.5 L  


 


Alkaline Phosphatase   


 


Albumin   


 


Urine Appearance   


 


Urine Protein   


 


Urine Blood   


 


Ur Leukocyte Esterase   


 


Urine RBC   


 


Urine WBC   


 


Urine WBC Clumps   


 


Urine Bacteria   


 


Influenza Type A RNA   














  03/07/20





  07:05


 


RBC 


 


Hgb 


 


Hct 


 


MCHC 


 


Plt Count 


 


Lymphocytes # 


 


PT 


 


INR 


 


APTT 


 


ABG pH 


 


ABG pCO2 


 


ABG pO2 


 


ABG HCO3 


 


ABG O2 Saturation 


 


Potassium 


 


Chloride 


 


Carbon Dioxide 


 


BUN 


 


Creatinine 


 


Glucose 


 


POC Glucose (mg/dL)  127 H


 


Plasma Lactic Acid Nii 


 


Calcium 


 


Alkaline Phosphatase 


 


Albumin 


 


Urine Appearance 


 


Urine Protein 


 


Urine Blood 


 


Ur Leukocyte Esterase 


 


Urine RBC 


 


Urine WBC 


 


Urine WBC Clumps 


 


Urine Bacteria 


 


Influenza Type A RNA 














- Diagnostic Findings


Chest x-ray: image reviewed





Assessment and Plan


Plan: 











1 acute influenza pneumonia


2 acute hypoxic/hypercapnic respiratory failure and the patient is currently on 

BiPAP for respiratory support


3 shortness of breath secondary to above


4 acute on chronic kidney injury.  The patient has stage III kidney disease and 

creatinine is up to 4.5


5 metabolic acidosis which is a combination of anion and non-anion gap 


6 traumatic Decker catheter insertion with hematuria


7 diabetes mellitus type 2


8 history of CVA with right-sided weakness and expressive aphasia


9 paroxysmal atrial fibrillation


10 chronic anxiety/depression


11 chronic anemia likely secondary to chronic kidney disease


12 osteoarthritis


13 anorexia


14 nursing home resident.








Plan





The patient has a DNR/DNI CODE STATUS per the son who is at the bedside


Continue BiPAP for respiratory support and appropriate BiPAP setting changes 

were done and were going to repeat the blood gas and 2 hours


Continue DuoNeb nebulized treatment around-the-clock


Continue Tamiflu


IV Solu Medrol 60 mg every 6 hours


Sliding scale blood sugar coverage with NovoLog


Keep the patient nothing by mouth for now


Hold the Eliquis


Monitor the urine output and continue flushing the Decker catheter to prevent any

plugging


Ultrasound the kidneys


His start the patient on a bicarb infusion at the rate of 100 mL an hour with D5

and 3 ampules of sodium bicarbonate


Echocardiogram


Continue IV Zosyn for possible superimposed aspiration


We'll continue to follow.  Patient is critical and prognosis for with increased 

mortality

## 2020-03-07 NOTE — XR
EXAMINATION TYPE: XR chest 1V portable

 

DATE OF EXAM: 3/7/2020

 

COMPARISON: 3/6/2020

 

HISTORY: Short of breath. Heart failure.

 

TECHNIQUE:

 

FINDINGS: There is no heart failure nor confluent pneumonic infiltrate. There is coarse lung markings
 on the left side. There are chest leads. Costophrenic angles are clear. Thoracic aorta is atheromato
us.

 

IMPRESSION: Mild coarsening of the lung markings consistent with minimal interstitial pneumonia or pu
lmonary fibrosis. No significant change compared to yesterday. No sign of heart failure.

## 2020-03-07 NOTE — US
EXAMINATION TYPE: US kidneys/renal and bladder

 

DATE OF EXAM: 3/7/2020

 

COMPARISON: NONE

 

CLINICAL HISTORY: hematuria, renal failure.

 

EXAM MEASUREMENTS:

 

Right Kidney:  10.2 x 5.5 x 6.4  cm

Left Kidney: 9.9 x 4.2 x 5.2 cm

 

 

Technically difficult exam on ICU patient. Performed portably and patient unable to change position.

 

Right Kidney: moderate hydro noted with echoes within.   

Left Kidney: mild hydro with fluid noted in renal pelvis  

Bladder: not seen, patient has catheter

 

 

Suboptimal due to portable technique and patient's body habitus along with limited mobility. Right ki
dney shows cortical thinning with moderate to severe pyelocaliectasis. Left kidney shows more mild to
 moderate left-sided pyelocaliectasis and lobulated cortex with increased cortical echogenicity. Fole
y catheter in bladder.

 

IMPRESSION: Suboptimal study. Moderate-to-severe right-sided hydronephrosis and mild/moderate left-si
ded hydronephrosis is felt present.

## 2020-03-07 NOTE — P.NPCON
History of Present Illness





- Reason for Consult


Consult date: 03/07/20


acute renal failure





- Chief Complaint


Shortness of breath





- History of Present Illness


79-year-old lady coming to the hospital from [facility] nursing home with 

shortness of breath.  No previous labs to compare.  She was diagnosed with 

influenza pneumonia currently on Tamiflu.  Decker catheter was playing which was 

traumatic and urine bag has bloody urine.  On admission serum creatinine was 4.1

and 4.5 today.  Unable to get much history from her as she is nonverbal with 

recent stroke.  Home medications does include ibuprofens 3 times a day.  No 

recent contrast studies.








Review of Systems


ROS unobtainable: due to mental status





Past Medical History


Past Medical History: CVA/TIA, Hyperlipidemia


Additional Past Medical History / Comment(s): Chronic anemia, chronic anxiety 

disorder, depression, history of paroxysmal atrial fibrillation, diabetes 

mellitus type 2, expressive aphasia following his stroke, running stage III 

kidney disease, hyperlipidemia, osteoarthritis, anorexia and ongoing weight 

loss.


History of Any Multi-Drug Resistant Organisms: None Reported


Additional Past Surgical History / Comment(s): pt poor historian


Past Psychological History: No Psychological Hx Reported


Smoking Status: Never smoker


Past Alcohol Use History: None Reported


Past Drug Use History: None Reported





Medications and Allergies


                                Home Medications











 Medication  Instructions  Recorded  Confirmed  Type


 


Acetaminophen Tab [Tylenol] 650 mg PO Q4H PRN 03/06/20 03/06/20 History


 


Apixaban [Eliquis] 2.5 mg PO BID 03/06/20 03/06/20 History


 


Aspirin 81 mg PO DAILY@0600 03/06/20 03/06/20 History


 


Atorvastatin [Lipitor] 40 mg PO HS@2000 03/06/20 03/06/20 History


 


Bethanechol [Urecholine] 25 mg PO TID@0700,1300,1900 03/06/20 03/06/20 History


 


Citalopram Hydrobromide 20 mg PO DAILY@0600 03/06/20 03/06/20 History





[Citalopram HBr]    


 


Dextrose Gel [Glutose 15 Gel] 1 applic PO DAILY PRN 03/06/20 03/06/20 History


 


Gabapentin [Neurontin] 100 mg PO TID@0700,1300,1900 03/06/20 03/06/20 History


 


Ibuprofen [Motrin Ib] 200 mg PO QID@07,13,19,23 03/06/20 03/06/20 History


 


Insulin Aspart [NovoLOG] 4 units SQ BID@1100,1600 03/06/20 03/06/20 History


 


Insulin Glargine,Hum.rec.anlog 22 unit SQ DAILY 03/06/20 03/06/20 History





[Lantus Solostar]    


 


Ipratropium-Albuterol Nebulize 3 ml INHALATION RT-Q6H PRN 03/06/20 03/06/20 

History





[Duoneb 0.5 mg-3 mg/3 ml Soln]    


 


Mag Hydrox/Aluminum Hyd/Simeth 30 ml PO Q4H PRN 03/06/20 03/06/20 History





[Mylanta Maximum Strength Liq]    


 


Magnesium Hydroxide [Milk of 2,400 mg PO Q72H PRN 03/06/20 03/06/20 History





Magnesia]    


 


Melatonin 1 mg PO HS@2000 03/06/20 03/06/20 History


 


Metoprolol Tartrate [Lopressor] 12.5 mg PO BID 03/06/20 03/06/20 History


 


Pantoprazole [Protonix] 40 mg PO DAILY@0600 03/06/20 03/06/20 History


 


Sennosides [Senna] 8.6 mg PO DAILY PRN 03/06/20 03/06/20 History


 


Tamsulosin [Flomax] 0.4 mg PO DAILY@0600 03/06/20 03/06/20 History


 


guaiFENesin--10MG/5ML 10 ml PO QID@05,11,17,23 03/06/20 03/06/20 History





[Robitussin DM]    








                                    Allergies











Allergy/AdvReac Type Severity Reaction Status Date / Time


 


No Known Allergies Allergy   Verified 03/06/20 17:33














Physical Exam


Vitals: 


                                   Vital Signs











  Temp Pulse Pulse Resp BP BP Pulse Ox


 


 03/07/20 08:10   99     


 


 03/07/20 08:00   105 H   20  127/58   98


 


 03/07/20 07:57   101 H     


 


 03/07/20 07:00   105 H   21  118/75   99


 


 03/07/20 06:04  97.4 F L  105 H   20  118/74   90 L


 


 03/07/20 05:00    99  20   121/72  100


 


 03/07/20 04:00  97.5 F L   102 H  22   116/68  88 L


 


 03/07/20 03:26  98.0 F   92  18   140/74 


 


 03/07/20 02:20  98.0 F   82  22   107/78  82 L


 


 03/06/20 23:00  97.4 F L   112 H  16   131/63  99


 


 03/06/20 19:18   100     


 


 03/06/20 19:08   102 H      94 L


 


 03/06/20 18:30  97.8 F   105 H  20   111/50  100


 


 03/06/20 17:15   90   16  99/63   99


 


 03/06/20 16:19   72   16  101/74   99


 


 03/06/20 13:51   88     


 


 03/06/20 13:40   86     


 


 03/06/20 11:25  97.4 F L  88   18  101/46   100








                                Intake and Output











 03/06/20 03/07/20 03/07/20





 22:59 06:59 14:59


 


Intake Total 50 100 


 


Output Total  100 0


 


Balance 50 0 0


 


Intake:   


 


  IV  100 


 


    Piperacillin-Tazobactam 3  100 





    .375 gm In Sodium   





    Chloride 0.9% 100 ml @ 25   





    mls/hr IVPB Q8H [facility] Rx#:   





    [id number]   


 


  Oral 50  


 


Output:   


 


  Urine  100 0


 


Other:   


 


  Voiding Method  Indwelling Catheter 


 


  Weight 67.132 kg  











No acute distress


S1-S2 heard


Lungs crackles


No edema


Decker bloody urine.








Results





- Lab Results


                             Most recent lab results











ABG pH  7.17  (7.35-7.45)  L*  03/07/20  05:22    


 


ABG pCO2  47 mmHg (35-45)  H  03/07/20  05:22    


 


ABG pO2  >400 mmHg ()  H  03/07/20  05:22    


 


ABG HCO3  17 mmol/L (21-25)  L  03/07/20  05:22    


 


ABG O2 Saturation  100.0 % (94-97)  H  03/07/20  05:22    


 


Calcium  7.5 mg/dL (8.4-10.2)  L  03/07/20  02:53    














                                 03/07/20 02:53





                                 03/07/20 02:53





Assessment and Plan


Assessment: 


#1 acute kidney injury suspect hemodynamic/septic ATN with low blood pressures, 

unknown baseline creatinine.


#2 history of NSAID use


#3 influenza pneumonia


#4 mild hyperkalemia secondary to acute kidney injury


#5 metabolic acidosis secondary to acute kidney injury


#6 hematuria secondary to traumatic Decker


#7 low normal blood pressures.





Plan: 


#1 give saline 1 L bolus followed by bicarb drip at 125 ML's an hour.


#2 treat hyperkalemia medically


#3 renal ultrasound for size and rule out obstruction


#4 continue ceftriaxone with a concern for complicated urinary tract infection


#5 no acute indication for renal replacement therapy at this time because of 

multiple comorbid conditions and poor prognosis.

## 2020-03-08 ENCOUNTER — HOSPITAL ENCOUNTER (INPATIENT)
Dept: HOSPITAL 47 - 6NMEDSUR | Age: 80
LOS: 2 days | DRG: 951 | End: 2020-03-10
Attending: HOSPITALIST | Admitting: HOSPITALIST
Payer: MEDICAID

## 2020-03-08 VITALS
TEMPERATURE: 98.7 F | DIASTOLIC BLOOD PRESSURE: 59 MMHG | SYSTOLIC BLOOD PRESSURE: 97 MMHG | RESPIRATION RATE: 12 BRPM | HEART RATE: 129 BPM

## 2020-03-08 VITALS — BODY MASS INDEX: 29.7 KG/M2

## 2020-03-08 DIAGNOSIS — Z79.899: ICD-10-CM

## 2020-03-08 DIAGNOSIS — A41.89: ICD-10-CM

## 2020-03-08 DIAGNOSIS — E11.9: ICD-10-CM

## 2020-03-08 DIAGNOSIS — J44.1: ICD-10-CM

## 2020-03-08 DIAGNOSIS — D62: ICD-10-CM

## 2020-03-08 DIAGNOSIS — Z51.5: Primary | ICD-10-CM

## 2020-03-08 DIAGNOSIS — E88.09: ICD-10-CM

## 2020-03-08 DIAGNOSIS — J10.01: ICD-10-CM

## 2020-03-08 DIAGNOSIS — J44.0: ICD-10-CM

## 2020-03-08 DIAGNOSIS — D69.6: ICD-10-CM

## 2020-03-08 DIAGNOSIS — G93.41: ICD-10-CM

## 2020-03-08 DIAGNOSIS — E87.2: ICD-10-CM

## 2020-03-08 DIAGNOSIS — E78.5: ICD-10-CM

## 2020-03-08 DIAGNOSIS — N17.0: ICD-10-CM

## 2020-03-08 DIAGNOSIS — Z66: ICD-10-CM

## 2020-03-08 DIAGNOSIS — J96.01: ICD-10-CM

## 2020-03-08 LAB
ANION GAP SERPL CALC-SCNC: 13 MMOL/L
BASOPHILS # BLD AUTO: 0 K/UL (ref 0–0.2)
BASOPHILS NFR BLD AUTO: 0 %
BUN SERPL-SCNC: 78 MG/DL (ref 7–17)
CALCIUM SPEC-MCNC: 6.2 MG/DL (ref 8.4–10.2)
CHLORIDE SERPL-SCNC: 103 MMOL/L (ref 98–107)
CO2 SERPL-SCNC: 27 MMOL/L (ref 22–30)
EOSINOPHIL # BLD AUTO: 0 K/UL (ref 0–0.7)
EOSINOPHIL NFR BLD AUTO: 0 %
ERYTHROCYTE [DISTWIDTH] IN BLOOD BY AUTOMATED COUNT: 2.09 M/UL (ref 3.8–5.4)
ERYTHROCYTE [DISTWIDTH] IN BLOOD: 15 % (ref 11.5–15.5)
GLUCOSE BLD-MCNC: 116 MG/DL (ref 75–99)
GLUCOSE BLD-MCNC: 208 MG/DL (ref 75–99)
GLUCOSE SERPL-MCNC: 171 MG/DL (ref 74–99)
HCT VFR BLD AUTO: 19 % (ref 34–46)
HGB BLD-MCNC: 5.8 GM/DL (ref 11.4–16)
LYMPHOCYTES # SPEC AUTO: 0.3 K/UL (ref 1–4.8)
LYMPHOCYTES NFR SPEC AUTO: 8 %
MCH RBC QN AUTO: 27.7 PG (ref 25–35)
MCHC RBC AUTO-ENTMCNC: 30.5 G/DL (ref 31–37)
MCV RBC AUTO: 91.1 FL (ref 80–100)
MONOCYTES # BLD AUTO: 0.2 K/UL (ref 0–1)
MONOCYTES NFR BLD AUTO: 5 %
NEUTROPHILS # BLD AUTO: 3.4 K/UL (ref 1.3–7.7)
NEUTROPHILS NFR BLD AUTO: 86 %
PLATELET # BLD AUTO: 99 K/UL (ref 150–450)
POTASSIUM SERPL-SCNC: 4.3 MMOL/L (ref 3.5–5.1)
SODIUM SERPL-SCNC: 143 MMOL/L (ref 137–145)
WBC # BLD AUTO: 4 K/UL (ref 3.8–10.6)

## 2020-03-08 RX ADMIN — INSULIN DETEMIR SCH UNIT: 100 INJECTION, SOLUTION SUBCUTANEOUS at 07:12

## 2020-03-08 RX ADMIN — GUAIFENESIN AND DEXTROMETHORPHAN SCH: 100; 10 SYRUP ORAL at 05:50

## 2020-03-08 RX ADMIN — OSELTAMIVIR PHOSPHATE SCH: 6 POWDER, FOR SUSPENSION ORAL at 11:18

## 2020-03-08 RX ADMIN — INSULIN ASPART SCH: 100 INJECTION, SOLUTION INTRAVENOUS; SUBCUTANEOUS at 11:25

## 2020-03-08 RX ADMIN — INSULIN ASPART SCH: 100 INJECTION, SOLUTION INTRAVENOUS; SUBCUTANEOUS at 05:50

## 2020-03-08 RX ADMIN — CITALOPRAM HYDROBROMIDE SCH: 20 TABLET ORAL at 05:50

## 2020-03-08 RX ADMIN — POTASSIUM CHLORIDE SCH: 14.9 INJECTION, SOLUTION INTRAVENOUS at 08:32

## 2020-03-08 RX ADMIN — IPRATROPIUM BROMIDE AND ALBUTEROL SULFATE SCH ML: .5; 3 SOLUTION RESPIRATORY (INHALATION) at 07:25

## 2020-03-08 RX ADMIN — GUAIFENESIN AND DEXTROMETHORPHAN SCH: 100; 10 SYRUP ORAL at 11:25

## 2020-03-08 RX ADMIN — APIXABAN SCH: 2.5 TABLET, FILM COATED ORAL at 18:14

## 2020-03-08 RX ADMIN — POTASSIUM CHLORIDE SCH: 14.9 INJECTION, SOLUTION INTRAVENOUS at 11:21

## 2020-03-08 RX ADMIN — GABAPENTIN SCH: 100 CAPSULE ORAL at 07:08

## 2020-03-08 RX ADMIN — INSULIN ASPART SCH UNIT: 100 INJECTION, SOLUTION INTRAVENOUS; SUBCUTANEOUS at 00:57

## 2020-03-08 RX ADMIN — METOPROLOL TARTRATE SCH: 1 INJECTION, SOLUTION INTRAVENOUS at 11:17

## 2020-03-08 RX ADMIN — IPRATROPIUM BROMIDE AND ALBUTEROL SULFATE PRN ML: .5; 3 SOLUTION RESPIRATORY (INHALATION) at 04:29

## 2020-03-08 RX ADMIN — ASPIRIN 325 MG ORAL TABLET SCH: 325 PILL ORAL at 11:17

## 2020-03-08 RX ADMIN — METOPROLOL TARTRATE SCH: 25 TABLET, FILM COATED ORAL at 18:14

## 2020-03-08 RX ADMIN — BETHANECHOL CHLORIDE SCH: 25 TABLET ORAL at 18:10

## 2020-03-08 RX ADMIN — IPRATROPIUM BROMIDE AND ALBUTEROL SULFATE SCH: .5; 3 SOLUTION RESPIRATORY (INHALATION) at 11:20

## 2020-03-08 RX ADMIN — MORPHINE SULFATE SCH MLS/HR: 50 INJECTION, SOLUTION, CONCENTRATE INTRAVENOUS at 23:19

## 2020-03-08 RX ADMIN — GABAPENTIN SCH: 100 CAPSULE ORAL at 18:10

## 2020-03-08 RX ADMIN — PIPERACILLIN AND TAZOBACTAM SCH: 3; .375 INJECTION, POWDER, FOR SOLUTION INTRAVENOUS at 18:09

## 2020-03-08 RX ADMIN — INSULIN ASPART SCH: 100 INJECTION, SOLUTION INTRAVENOUS; SUBCUTANEOUS at 18:09

## 2020-03-08 RX ADMIN — TAMSULOSIN HYDROCHLORIDE SCH: 0.4 CAPSULE ORAL at 05:51

## 2020-03-08 RX ADMIN — METHYLPREDNISOLONE SODIUM SUCCINATE SCH: 125 INJECTION, POWDER, FOR SOLUTION INTRAMUSCULAR; INTRAVENOUS at 18:09

## 2020-03-08 RX ADMIN — METHYLPREDNISOLONE SODIUM SUCCINATE SCH MG: 125 INJECTION, POWDER, FOR SOLUTION INTRAMUSCULAR; INTRAVENOUS at 07:11

## 2020-03-08 RX ADMIN — BETHANECHOL CHLORIDE SCH: 25 TABLET ORAL at 07:08

## 2020-03-08 RX ADMIN — PANTOPRAZOLE SODIUM SCH: 40 INJECTION, POWDER, FOR SOLUTION INTRAVENOUS at 11:18

## 2020-03-08 NOTE — P.PN
Subjective


Progress Note Date: 03/08/20


Follow-up for acute kidney injury.








Objective





- Vital Signs


Vital signs: 


                                   Vital Signs











Temp  98.8 F   03/08/20 08:00


 


Pulse  118 H  03/08/20 10:00


 


Resp  10 L  03/08/20 10:00


 


BP  126/65   03/08/20 10:00


 


Pulse Ox  99   03/08/20 10:00








                                 Intake & Output











 03/07/20 03/08/20 03/08/20





 17:59 06:59 18:59


 


Intake Total   330


 


Output Total   20


 


Balance   310


 


Weight   


 


Intake:   


 


  IV   330


 


    Dextrose 5% in Water 1,   250





    000 ml @ 125 mls/hr IV .   





    Q9H12M INESSA with Sodium   





    Bicarb (1 Meq/ml) 150 ml   





    Rx#:[id number]   


 


    KVO   80


 


    Piperacillin-Tazobactam 3   





    .375 gm In Sodium   





    Chloride 0.9% 100 ml @ 25   





    mls/hr IVPB Q8H INESSA Rx#:   





    [id number]   


 


Output:   


 


  Urine   20


 


Other:   


 


  Voiding Method   














- Exam


No acute distress


S1-S2 heard


Decreased breath sounds


Decker catheter bloody urine


Edema








- Labs


CBC & Chem 7: 


                                 03/08/20 04:23





                                 03/08/20 04:23


Labs: 


                  Abnormal Lab Results - Last 24 Hours (Table)











  03/06/20 03/07/20 03/07/20 Range/Units





  13:30 11:10 11:38 


 


RBC     (3.80-5.40)  m/uL


 


Hgb     (11.4-16.0)  gm/dL


 


Hct     (34.0-46.0)  %


 


MCHC     (31.0-37.0)  g/dL


 


Plt Count     (150-450)  k/uL


 


Lymphocytes #     (1.0-4.8)  k/uL


 


ABG pH   7.18 L*   (7.35-7.45)  


 


ABG pO2   172 H   ()  mmHg


 


ABG HCO3   16 L   (21-25)  mmol/L


 


ABG Total CO2   17 L   (19-24)  mmol/L


 


ABG O2 Saturation   99.4 H   (94-97)  %


 


BUN     (7-17)  mg/dL


 


Creatinine     (0.52-1.04)  mg/dL


 


Glucose     (74-99)  mg/dL


 


POC Glucose (mg/dL)    230 H  (75-99)  mg/dL


 


Calcium     (8.4-10.2)  mg/dL


 


Albumin     (3.5-5.0)  g/dL


 


Crossmatch  See Detail    














  03/07/20 03/07/20 03/08/20 Range/Units





  16:51 20:15 00:50 


 


RBC     (3.80-5.40)  m/uL


 


Hgb     (11.4-16.0)  gm/dL


 


Hct     (34.0-46.0)  %


 


MCHC     (31.0-37.0)  g/dL


 


Plt Count     (150-450)  k/uL


 


Lymphocytes #     (1.0-4.8)  k/uL


 


ABG pH     (7.35-7.45)  


 


ABG pO2     ()  mmHg


 


ABG HCO3     (21-25)  mmol/L


 


ABG Total CO2     (19-24)  mmol/L


 


ABG O2 Saturation     (94-97)  %


 


BUN     (7-17)  mg/dL


 


Creatinine     (0.52-1.04)  mg/dL


 


Glucose     (74-99)  mg/dL


 


POC Glucose (mg/dL)  377 H  326 H  208 H  (75-99)  mg/dL


 


Calcium     (8.4-10.2)  mg/dL


 


Albumin     (3.5-5.0)  g/dL


 


Crossmatch     














  03/08/20 03/08/20 03/08/20 Range/Units





  04:23 04:23 04:23 


 


RBC  2.09 L    (3.80-5.40)  m/uL


 


Hgb  5.8 L* D    (11.4-16.0)  gm/dL


 


Hct  19.0 L*    (34.0-46.0)  %


 


MCHC  30.5 L    (31.0-37.0)  g/dL


 


Plt Count  99 L    (150-450)  k/uL


 


Lymphocytes #  0.3 L    (1.0-4.8)  k/uL


 


ABG pH     (7.35-7.45)  


 


ABG pO2     ()  mmHg


 


ABG HCO3     (21-25)  mmol/L


 


ABG Total CO2     (19-24)  mmol/L


 


ABG O2 Saturation     (94-97)  %


 


BUN   78 H   (7-17)  mg/dL


 


Creatinine   4.52 H   (0.52-1.04)  mg/dL


 


Glucose   171 H   (74-99)  mg/dL


 


POC Glucose (mg/dL)     (75-99)  mg/dL


 


Calcium   6.2 L*   (8.4-10.2)  mg/dL


 


Albumin    2.4 L  (3.5-5.0)  g/dL


 


Crossmatch     














  03/08/20 Range/Units





  05:43 


 


RBC   (3.80-5.40)  m/uL


 


Hgb   (11.4-16.0)  gm/dL


 


Hct   (34.0-46.0)  %


 


MCHC   (31.0-37.0)  g/dL


 


Plt Count   (150-450)  k/uL


 


Lymphocytes #   (1.0-4.8)  k/uL


 


ABG pH   (7.35-7.45)  


 


ABG pO2   ()  mmHg


 


ABG HCO3   (21-25)  mmol/L


 


ABG Total CO2   (19-24)  mmol/L


 


ABG O2 Saturation   (94-97)  %


 


BUN   (7-17)  mg/dL


 


Creatinine   (0.52-1.04)  mg/dL


 


Glucose   (74-99)  mg/dL


 


POC Glucose (mg/dL)  116 H  (75-99)  mg/dL


 


Calcium   (8.4-10.2)  mg/dL


 


Albumin   (3.5-5.0)  g/dL


 


Crossmatch   








                      Microbiology - Last 24 Hours (Table)











 03/06/20 12:58 Blood Culture - Preliminary





 Blood    No Growth after 24 hours


 


 03/07/20 00:25 Urine Culture - Preliminary





 Urine,Voided 














Assessment and Plan


Assessment: 


#1 acute kidney injury secondary to bilateral hydronephrosis with obstructive 

uropathy


#2 history of NSAID use


#3 influenza pneumonia


#4 mild hyperkalemia secondary to acute kidney injury


#5 metabolic acidosis secondary to acute kidney injury


#6 hematuria secondary to traumatic Decker


#7 low normal blood pressures.





Plan: 


#1 family inclining for comfort care. 


#2 will sign off.

## 2020-03-08 NOTE — XR
EXAMINATION TYPE: XR chest 1V portable

 

DATE OF EXAM: 3/8/2020

 

Comparison: 3/7/2020

 

Clinical History: 79 year-old female shortness of breath

 

Findings:

Heart mildly enlarged. Diffuse interstitial prominence. Right upper lobe/apical density. Small amount
 of new peripheral right basilar density. No sizable effusion.

 

 

Impression:

 

1. Similar mild cardiomegaly.

2. Some patchy medial right upper lobe and peripheral right basilar density slightly increased. Possi
ble patchy infiltrates versus areas of atelectasis.

## 2020-03-08 NOTE — P.PN
Subjective


Progress Note Date: 03/08/20





79-year-old female patient, known history of CVA with right-sided hemiplegia, 

was been essentially bedridden living at [facility]..  The patient came into 

the emergency department with difficulty breathing.  She was transferred from 

the nursing home.  She was having increased cough and shortness of breath and 

chest tightness and wheezing.  Most of asthma.  Most of COPD.  Her pulse ox was 

low and the low 70s.  She was initially placed on 2 L about 2 by nasal cannula 

and she was admitted to the floor.  Influenza a was positive.  Started on 

Tamiflu.  Overnight, the patient became more tachypneic and short of breath and 

hypoxic.  She got transferred to the intensive care unit.  She is a DNR/DNI CODE

STATUS.  Currently she is on BiPAP.  The BiPAP setting was discussed with 

throughout the night.  Her blood gases showed a common initial respiratory and 

metabolic acidosis.  PH was at 7.17 with a pCO2 of 47 and pO2 of more than 400. 

The most recent setting that I have around this morning is a BiPAP of 12 over 4 

cm in addition to an FiO2 of 70% and I time of 0.8.  The chest x-ray from this 

morning is showing scattered interstitial infiltrates bilaterally consistent 

with essential pneumoniaairfit no evidence of any airspace disease or 

consolidation.  She is afebrile.  She is on bronchodilators patient on systemic 

steroids.  She had developed an acute kidney injury.  Creatinine is up to 4.5 

this morning in addition to a metabolic acidosis with a serum bicarb of 16.  The

Decker catheter was inserted and this was a traumatic insertion and the patient 

has a bloody urine output which is in the order of 20 mL an hour.  Her IV fluids

is running at KVO.  She is very lethargic.  She is arousable however she doesn't

follow commands and she is not answering questions appropriately.  The son is at

the bedside.  He confirms the DNR/DNI CODE STATUS.





The patient is seen today 03/08/2020 and follow-up in the intensive care unit.  

She has not made much progress.  She is still mostly unresponsive.  Somewhat 

restless.  Still with hematuria.  She developed a nosebleed as well.  She had a 

drop in hemoglobin to 5.3.  White count 4.0.  Platelet counts 99,000.  

Creatinine 4.52.  Calcium 6.2.  Albumin 2.4.  The patient's son and 

granddaughter are at the bedside. They do not want blood transfusion.  They're 

now requesting hospice/comfort care. 





Objective





- Vital Signs


Vital signs: 


                                   Vital Signs











Temp  98.8 F   03/08/20 08:00


 


Pulse  135 H  03/08/20 09:00


 


Resp  13   03/08/20 09:00


 


BP  124/65   03/08/20 09:00


 


Pulse Ox  100   03/08/20 09:00








                                 Intake & Output











 03/07/20 03/08/20 03/08/20





 17:59 06:59 18:59


 


Intake Total   310


 


Output Total   20


 


Balance   290


 


Weight   


 


Intake:   


 


  IV   310


 


    Dextrose 5% in Water 1,   250





    000 ml @ 125 mls/hr IV .   





    Q9H12M INESSA with Sodium   





    Bicarb (1 Meq/ml) 150 ml   





    Rx#:[id number]   


 


    KVO   60


 


    Piperacillin-Tazobactam 3   





    .375 gm In Sodium   





    Chloride 0.9% 100 ml @ 25   





    mls/hr IVPB Q8H INESSA Rx#:   





    [id number]   


 


Output:   


 


  Urine   20


 


Other:   


 


  Voiding Method   














- Exam





Lethargic, 79-year-old female patient, currently on a BiPAP.  The patient is 

aphasic and she is unable to communicate at this point in time.  She is not 

following commands.  She is in mild degree of respiratory distress even while 

being on a BiPAP with a full face mask.


Head exam was generally normal. There was no scleral icterus or corneal arcus. 

Mucous membranes were moist.


Neck was supple and without jugular venous distension, thyromegaly, or carotid 

bruits. Carotids were easily palpable bilaterally. There was no adenopathy.


Lungs sounds reveal diffuse rhonchi and diffuse expiratory wheezes heard 

throughout the lung fields bilaterally.  The patient has also some mild kyphosis

of the thoracic spine.


Cardiac exam revealed the PMI to be normally situated and sized. The rhythm was 

regular and no extrasystoles were noted during several minutes of auscultation. 

The first and second heart sounds were normal and physiologic splitting of the 

second heart sound was noted. There were no murmurs, rubs, clicks, or gallops.  

This morning the patient is tachycardic and she is in sinus tachycardia.  No 

atrial fibrillation was noted.


Abdominal exam revealed normal bowel sounds. The abdomen was soft, non-tender, 

and without masses, organomegaly, or appreciable enlargement of the abdominal 

aorta.


Examination of the extremities revealed easily palpable radial, femoral and 

pedal pulses. There was no cyanosis, clubbing or edema.


Examination of the skin revealed no evidence of significant rashes, suspicious 

appearing nevi or other concerning lesions.


Neurologically the patient has a right-sided weakness related to previous CVA 

along with expressive aphasia.








- Labs


CBC & Chem 7: 


                                 03/08/20 04:23





                                 03/08/20 04:23


Labs: 


                  Abnormal Lab Results - Last 24 Hours (Table)











  03/06/20 03/07/20 03/07/20 Range/Units





  13:30 11:10 11:38 


 


RBC     (3.80-5.40)  m/uL


 


Hgb     (11.4-16.0)  gm/dL


 


Hct     (34.0-46.0)  %


 


MCHC     (31.0-37.0)  g/dL


 


Plt Count     (150-450)  k/uL


 


Lymphocytes #     (1.0-4.8)  k/uL


 


ABG pH   7.18 L*   (7.35-7.45)  


 


ABG pO2   172 H   ()  mmHg


 


ABG HCO3   16 L   (21-25)  mmol/L


 


ABG Total CO2   17 L   (19-24)  mmol/L


 


ABG O2 Saturation   99.4 H   (94-97)  %


 


BUN     (7-17)  mg/dL


 


Creatinine     (0.52-1.04)  mg/dL


 


Glucose     (74-99)  mg/dL


 


POC Glucose (mg/dL)    230 H  (75-99)  mg/dL


 


Calcium     (8.4-10.2)  mg/dL


 


Albumin     (3.5-5.0)  g/dL


 


Crossmatch  See Detail    














  03/07/20 03/07/20 03/08/20 Range/Units





  16:51 20:15 00:50 


 


RBC     (3.80-5.40)  m/uL


 


Hgb     (11.4-16.0)  gm/dL


 


Hct     (34.0-46.0)  %


 


MCHC     (31.0-37.0)  g/dL


 


Plt Count     (150-450)  k/uL


 


Lymphocytes #     (1.0-4.8)  k/uL


 


ABG pH     (7.35-7.45)  


 


ABG pO2     ()  mmHg


 


ABG HCO3     (21-25)  mmol/L


 


ABG Total CO2     (19-24)  mmol/L


 


ABG O2 Saturation     (94-97)  %


 


BUN     (7-17)  mg/dL


 


Creatinine     (0.52-1.04)  mg/dL


 


Glucose     (74-99)  mg/dL


 


POC Glucose (mg/dL)  377 H  326 H  208 H  (75-99)  mg/dL


 


Calcium     (8.4-10.2)  mg/dL


 


Albumin     (3.5-5.0)  g/dL


 


Crossmatch     














  03/08/20 03/08/20 03/08/20 Range/Units





  04:23 04:23 04:23 


 


RBC  2.09 L    (3.80-5.40)  m/uL


 


Hgb  5.8 L* D    (11.4-16.0)  gm/dL


 


Hct  19.0 L*    (34.0-46.0)  %


 


MCHC  30.5 L    (31.0-37.0)  g/dL


 


Plt Count  99 L    (150-450)  k/uL


 


Lymphocytes #  0.3 L    (1.0-4.8)  k/uL


 


ABG pH     (7.35-7.45)  


 


ABG pO2     ()  mmHg


 


ABG HCO3     (21-25)  mmol/L


 


ABG Total CO2     (19-24)  mmol/L


 


ABG O2 Saturation     (94-97)  %


 


BUN   78 H   (7-17)  mg/dL


 


Creatinine   4.52 H   (0.52-1.04)  mg/dL


 


Glucose   171 H   (74-99)  mg/dL


 


POC Glucose (mg/dL)     (75-99)  mg/dL


 


Calcium   6.2 L*   (8.4-10.2)  mg/dL


 


Albumin    2.4 L  (3.5-5.0)  g/dL


 


Crossmatch     














  03/08/20 Range/Units





  05:43 


 


RBC   (3.80-5.40)  m/uL


 


Hgb   (11.4-16.0)  gm/dL


 


Hct   (34.0-46.0)  %


 


MCHC   (31.0-37.0)  g/dL


 


Plt Count   (150-450)  k/uL


 


Lymphocytes #   (1.0-4.8)  k/uL


 


ABG pH   (7.35-7.45)  


 


ABG pO2   ()  mmHg


 


ABG HCO3   (21-25)  mmol/L


 


ABG Total CO2   (19-24)  mmol/L


 


ABG O2 Saturation   (94-97)  %


 


BUN   (7-17)  mg/dL


 


Creatinine   (0.52-1.04)  mg/dL


 


Glucose   (74-99)  mg/dL


 


POC Glucose (mg/dL)  116 H  (75-99)  mg/dL


 


Calcium   (8.4-10.2)  mg/dL


 


Albumin   (3.5-5.0)  g/dL


 


Crossmatch   








                      Microbiology - Last 24 Hours (Table)











 03/06/20 12:58 Blood Culture - Preliminary





 Blood    No Growth after 24 hours


 


 03/07/20 00:25 Urine Culture - Preliminary





 Urine,Voided 














Assessment and Plan


Assessment: 





1 acute influenza pneumonia


2 acute hypoxic/hypercapnic respiratory failure and the patient is currently on 

BiPAP for respiratory support


3 shortness of breath secondary to above


4 acute on chronic kidney injury.  The patient has stage III kidney disease and 

creatinine is up to 4.52


5 metabolic acidosis which is a combination of anion and non-anion gap 


6 traumatic Decker catheter insertion with hematuria


7 diabetes mellitus type 2


8 history of CVA with right-sided weakness and expressive aphasia


9 paroxysmal atrial fibrillation


10 chronic anxiety/depression


11 chronic anemia likely secondary to chronic kidney disease


12 osteoarthritis


13 anorexia


14 nursing home resident.





Plan





The patient was seen and evaluated by Dr. Randhawa.  The patient did have a drop

in hemoglobin to 5.3.  The patient's son and granddaughter at the bedside.  

They're declining blood transfusions.  They're requesting hospice/comfort care. 

This is quite reasonable in this patient's situation.  Orders will be placed.





I, the cosigning physician, performed a history & physical examination of the 

patient. Lungs sounds with bilateral scattered rhonchi.  Maintaining good O2 

saturations in the 90s on 100% FiO2 on the BiPAP.  I discussed the assessment 

and plan of care with my nurse practitioner, Kaylyn Moran. I attest to the above 

note as dictated by her.

## 2020-03-08 NOTE — PN
PROGRESS NOTE



DATE OF SERVICE:

03/08/2020



This 80-year-old woman was admitted with COPD exacerbation as well as possible

bibasilar pneumonia, influenza possible; also, change in mental status.  Patient also

developed anemia with hemoglobin dropping to 5.8. Currently the patient is on BiPAP.

Patient continues to be unresponsive and the family is thinking about comfort measures

also.



PAST MEDICAL HISTORY:

Reviewed.



Review of systems could not be taken, patient is stuporous.



CURRENT MEDICATIONS:

1. Tylenol p.r.n.

2. Norco.

3. Maalox.

4. DuoNeb.

5. Xanax.

6. Aspirin.

7. Lipitor.

8. Urecholine.

9. Celexa.

10.Dilaudid.

11.NovoLog scale.

12.Levemir.

13.Melatonin.

14.Solu-Medrol.

15.Milk of Magnesia.



PHYSICAL EXAM:

Pulse is 125, blood pressure 90/64, respiration 12, temperature 98.7, pulse ox 86

percent on 2 L.

HEENT:  Conjunctivae normal. Oral mucosa moist.

NECK:  No jugular venous distention.  No lymph node enlargement.

CARDIOVASCULAR:  S1, S2.

RESPIRATORY: Diminished breath sounds at the bases. A few scattered rhonchi and

crackles.  Expiratory wheezing.

ABDOMEN: Soft.

NERVOUS SYSTEM: No focal deficits.



LABS:

WBC 4, hemoglobin 5.8, sodium 140, potassium 4.3.



ASSESSMENT:

1. Chronic obstructive pulmonary disease acute exacerbation with acute bibasilar

    pneumonia, possibly influenza pneumonia with acute hypoxic respiratory failure with

    possible sepsis, present on admission.

2. Influenza A positive.

3. Anemia, possibly acute blood-loss anemia, exact etiology undetermined.

4. Change in mental status, metabolic encephalopathy, acute on chronic.

5. Acute renal failure with possible acute tubular necrosis, prerenal factors.

6. History of chronic obstructive pulmonary disease.

7. Thrombocytopenia.

8. Diabetes mellitus type 2.

9. Elevated plasma lactic acid secondary to sepsis.

10.Hypoalbuminemia with no evidence of malnutrition.

11.Severe metabolic acidosis.

12.Hyperlipidemia.

13.FULL CODE.



RECOMMENDATIONS AND DISCUSSION:

Recommend to continue current medications, continue to monitor, symptomatic treatment.

Had a detailed discussion with the family and the family is opting for comfort measures

and possibly hospice.  We will consult hospice and continue to monitor.  Once again,

the prognosis is extremely guarded.  The family does not want any further evaluation or

even blood transfusion at this time.  Hemoglobin is significantly low at 5.8.  Once

again, possibly blood-loss anemia, exact etiology undetermined.





MMODL / IJN: [id number] / Job#: [id number]

## 2020-03-09 RX ADMIN — MORPHINE SULFATE SCH MLS/HR: 50 INJECTION, SOLUTION, CONCENTRATE INTRAVENOUS at 14:17

## 2020-03-09 NOTE — PN
PROGRESS NOTE



DATE OF SERVICE:

03/09/2020



This 79-year-old woman who was admitted with COPD acute exacerbation also had acute

influenza pneumonia.  The patient also had acute hypoxic respiratory failure.  The

patient has taken a turn for the worse with significant difficulty in breathing and

family has decided to transfer the patient to hospice care.  The patient is on hospice,

currently being closely monitored.  Patient is on morphine drip.  Patient is sedated.



EXAM:

The respirations are 6 and the patient is sedated.



CARDIOVASCULAR:  Normal.

RESPIRATORY: Bilateral scattered rhonchi.

NERVOUS SYSTEM: Sedated.



LABS:

Not available.



ASSESSMENT:

1. Chronic obstructive pulmonary disease exacerbation with acute bibasilar pneumonia,

    possibly influenza pneumonia with acute hypoxic respiratory failure with possible

    sepsis, present on admission.

2. Influenza A positive.

3. Anemia, possibly acute blood loss anemia, exact etiology undetermined.

4. Change in mental status, metabolic encephalopathy, acute on chronic.

5. Acute renal failure with possible acute tubular necrosis, prerenal factors.

6. History of chronic obstructive pulmonary disease.

7. Thrombocytopenia.

8. Diabetes mellitus type 2.

9. Elevated plasma lactic acid secondary to sepsis.

10.Hypoalbuminemia with no evidence of malnutrition.

11.Severe metabolic acidosis.

12.Hyperlipidemia.

13.FULL CODE.



RECOMMENDATIONS AND DISCUSSION:

Continue current medications, continue to monitor, symptomatic treatment. Otherwise, at

this time I recommend to continue with morphine and continue with comfort measures.

Prognosis guarded.  Discussed with family at length.  Further recommendations to

follow.





KASHMIR / NAPOLEONN: [id number] / Job#: [id number]

## 2020-03-10 VITALS — RESPIRATION RATE: 14 BRPM

## 2020-03-10 RX ADMIN — MORPHINE SULFATE SCH MLS/HR: 50 INJECTION, SOLUTION, CONCENTRATE INTRAVENOUS at 06:06

## 2020-03-11 NOTE — DS
DISCHARGE SUMMARY



The preliminary cause of death is acute influenza A and influenza pneumonia.



OTHER DIAGNOSES:

1. Chronic obstructive pulmonary disease acute exacerbation with bibasilar pneumonia

    possibly gram-negative with acute hypoxic respiratory failure with possible sepsis,

    present on admission.

2. Anemia, possible acute blood-loss anemia, exact etiology undetermined.

3. Change in mental status, metabolic encephalopathy, acute on chronic.

4. Acute renal failure with possible acute tubular necrosis prerenal factors.

5. History of chronic obstructive pulmonary disease.

6. Thrombocytopenia.

7. Diabetes mellitus type 2.

8. Elevated plasma lactic acid secondary to sepsis.

9. Hypoalbuminemia with no evidence of malnutrition.

10.Severe metabolic acidosis.

11.Hyperlipidemia.

12.NO CODE NO CPR, NO VENT.

13.Hospice.



HISTORY OF PRESENT ILLNESS:

This 79-year-old woman with a past medical history of multiple medical problems being

followed by Dr. Benton in [facility], was admitted with acute influenza A

positive, bilateral pneumonia, COPD acute exacerbation, hypoxic respiratory failure,

sepsis, and multiple other complex medical issues.  Patient treated medically

initially, but the patient did not improve. Patient was on BiPAP.  The case was

discussed with the family on multiple occasions because of the patient's age and

multiple complex comorbidities and it was decided to go with comfort measures in

hospice and the patient succumbed to her above mentioned illnesses. The prognosis was

extremely guarded throughout the hospitalization.

Please refer to the multiple progress notes and consult notes and staff notes for

further details.





MMODL / IJN: [id number] / Job#: [id number]